# Patient Record
Sex: FEMALE | Race: ASIAN | NOT HISPANIC OR LATINO | URBAN - METROPOLITAN AREA
[De-identification: names, ages, dates, MRNs, and addresses within clinical notes are randomized per-mention and may not be internally consistent; named-entity substitution may affect disease eponyms.]

---
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
## 2024-05-20 NOTE — ED PROVIDER NOTE - NS ED ATTENDING STATEMENT MOD
good, to achieve stated therapy goals
I have personally provided the amount of critical care time documented below concurrently with the resident/fellow.  This time excludes time spent on separate procedures and time spent teaching. I have reviewed the resident’s / fellow’s documentation and I agree with the history, exam, and assessment and plan of care.

## 2024-07-12 NOTE — ED PEDIATRIC NURSE NOTE - PRO INTERPRETER NEED 2
"7/12/2024      Savanna Chakraborty  [address] [facility] [address]      Dear Colleague,    Thank you for referring your patient, Savanna Chakraborty, to the [facility]. Please see a copy of my visit note below.     Current Eye Medications:  Celluvisc as needed both eyes     Subjective:  Complete eye exam. Vision comes and goes with dryness. Vision usually does well in the morning, then gets blurry in afternoon both eyes. Always itching both eyes. No eye pain in either eye.      Objective:  See Ophthalmology Exam.       Assessment:  Excellent result from recent lid surgery with AM; otherwise stable eye exam.      ICD-10-CM    1. Encounter for examination of eyes and vision with abnormal findings  Z01.01       2. Presbyopia  H52.4       3. Bilateral corneal scars  H17.9       4. History of tarsorrhaphy, ou  Z98.890       5. History of blepharoplasty, ptosis repair, ou (AM)  Z98.890       6. 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 Can use +1.50 over the counter glasses for computer work.     May use artificial tears up to four times a day (like Refresh Optive, Systane Balance, or TheraTears. Avoid \"get the red out\" drops and generic artifical tears).     Continue Celluvisc drops as needed     Call in March 2025 for an appointment in July 2025 for Complete Exam    Dr. Bennett [phone]         Again, thank you for allowing me to participate in the care of your patient.        Sincerely,        Easton Bennett MD  " English

## 2024-08-29 ENCOUNTER — APPOINTMENT (OUTPATIENT)
Dept: MRI IMAGING | Facility: CLINIC | Age: 19
End: 2024-08-29

## 2024-08-29 ENCOUNTER — OUTPATIENT (OUTPATIENT)
Dept: OUTPATIENT SERVICES | Facility: HOSPITAL | Age: 19
LOS: 1 days | End: 2024-08-29
Payer: COMMERCIAL

## 2024-08-29 DIAGNOSIS — Z98.890 OTHER SPECIFIED POSTPROCEDURAL STATES: Chronic | ICD-10-CM

## 2024-08-29 DIAGNOSIS — Z00.8 ENCOUNTER FOR OTHER GENERAL EXAMINATION: ICD-10-CM

## 2024-08-29 DIAGNOSIS — L65.9 NONSCARRING HAIR LOSS, UNSPECIFIED: ICD-10-CM

## 2024-08-29 DIAGNOSIS — D35.00 BENIGN NEOPLASM OF UNSPECIFIED ADRENAL GLAND: ICD-10-CM

## 2024-08-29 PROCEDURE — 74181 MRI ABDOMEN W/O CONTRAST: CPT | Mod: 26

## 2024-08-29 PROCEDURE — 74181 MRI ABDOMEN W/O CONTRAST: CPT

## 2024-09-22 ENCOUNTER — EMERGENCY (EMERGENCY)
Facility: HOSPITAL | Age: 19
LOS: 1 days | Discharge: HOME | End: 2024-09-22
Attending: EMERGENCY MEDICINE
Payer: COMMERCIAL

## 2024-09-22 VITALS
HEIGHT: 66 IN | WEIGHT: 134.92 LBS | TEMPERATURE: 100 F | HEART RATE: 117 BPM | RESPIRATION RATE: 20 BRPM | OXYGEN SATURATION: 99 % | DIASTOLIC BLOOD PRESSURE: 80 MMHG | SYSTOLIC BLOOD PRESSURE: 131 MMHG

## 2024-09-22 DIAGNOSIS — Z98.890 OTHER SPECIFIED POSTPROCEDURAL STATES: Chronic | ICD-10-CM

## 2024-09-22 LAB
ALBUMIN SERPL ELPH-MCNC: 3.7 G/DL — SIGNIFICANT CHANGE UP (ref 3.3–5)
ALP SERPL-CCNC: 73 U/L — SIGNIFICANT CHANGE UP (ref 40–120)
ALT FLD-CCNC: 16 U/L — SIGNIFICANT CHANGE UP (ref 10–45)
ANION GAP SERPL CALC-SCNC: 11 MMOL/L — SIGNIFICANT CHANGE UP (ref 5–17)
AST SERPL-CCNC: 12 U/L — SIGNIFICANT CHANGE UP (ref 10–40)
BASOPHILS # BLD AUTO: 0.05 K/UL — SIGNIFICANT CHANGE UP (ref 0–0.2)
BASOPHILS NFR BLD AUTO: 0.3 % — SIGNIFICANT CHANGE UP (ref 0–2)
BILIRUB SERPL-MCNC: 1 MG/DL — SIGNIFICANT CHANGE UP (ref 0.2–1.2)
BUN SERPL-MCNC: 9 MG/DL — SIGNIFICANT CHANGE UP (ref 7–23)
CALCIUM SERPL-MCNC: 8.5 MG/DL — SIGNIFICANT CHANGE UP (ref 8.4–10.5)
CHLORIDE SERPL-SCNC: 104 MMOL/L — SIGNIFICANT CHANGE UP (ref 96–108)
CO2 SERPL-SCNC: 24 MMOL/L — SIGNIFICANT CHANGE UP (ref 22–31)
CREAT SERPL-MCNC: 0.73 MG/DL — SIGNIFICANT CHANGE UP (ref 0.5–1.3)
EGFR: 121 ML/MIN/1.73M2 — SIGNIFICANT CHANGE UP
EOSINOPHIL # BLD AUTO: 0.08 K/UL — SIGNIFICANT CHANGE UP (ref 0–0.5)
EOSINOPHIL NFR BLD AUTO: 0.5 % — SIGNIFICANT CHANGE UP (ref 0–6)
GAS PNL BLDV: SIGNIFICANT CHANGE UP
GLUCOSE SERPL-MCNC: 100 MG/DL — HIGH (ref 70–99)
HCT VFR BLD CALC: 38.9 % — SIGNIFICANT CHANGE UP (ref 34.5–45)
HGB BLD-MCNC: 12.5 G/DL — SIGNIFICANT CHANGE UP (ref 11.5–15.5)
IMM GRANULOCYTES NFR BLD AUTO: 0.3 % — SIGNIFICANT CHANGE UP (ref 0–0.9)
LYMPHOCYTES # BLD AUTO: 1.75 K/UL — SIGNIFICANT CHANGE UP (ref 1–3.3)
LYMPHOCYTES # BLD AUTO: 10.6 % — LOW (ref 13–44)
MCHC RBC-ENTMCNC: 29.6 PG — SIGNIFICANT CHANGE UP (ref 27–34)
MCHC RBC-ENTMCNC: 32.1 GM/DL — SIGNIFICANT CHANGE UP (ref 32–36)
MCV RBC AUTO: 92 FL — SIGNIFICANT CHANGE UP (ref 80–100)
MONOCYTES # BLD AUTO: 0.96 K/UL — HIGH (ref 0–0.9)
MONOCYTES NFR BLD AUTO: 5.8 % — SIGNIFICANT CHANGE UP (ref 2–14)
NEUTROPHILS # BLD AUTO: 13.67 K/UL — HIGH (ref 1.8–7.4)
NEUTROPHILS NFR BLD AUTO: 82.5 % — HIGH (ref 43–77)
NRBC # BLD: 0 /100 WBCS — SIGNIFICANT CHANGE UP (ref 0–0)
PLATELET # BLD AUTO: 153 K/UL — SIGNIFICANT CHANGE UP (ref 150–400)
POTASSIUM SERPL-MCNC: 4 MMOL/L — SIGNIFICANT CHANGE UP (ref 3.5–5.3)
POTASSIUM SERPL-SCNC: 4 MMOL/L — SIGNIFICANT CHANGE UP (ref 3.5–5.3)
PROT SERPL-MCNC: 6.7 G/DL — SIGNIFICANT CHANGE UP (ref 6–8.3)
RBC # BLD: 4.23 M/UL — SIGNIFICANT CHANGE UP (ref 3.8–5.2)
RBC # FLD: 12.5 % — SIGNIFICANT CHANGE UP (ref 10.3–14.5)
S PYO AG SPEC QL IA: NEGATIVE — SIGNIFICANT CHANGE UP
SODIUM SERPL-SCNC: 139 MMOL/L — SIGNIFICANT CHANGE UP (ref 135–145)
WBC # BLD: 16.56 K/UL — HIGH (ref 3.8–10.5)
WBC # FLD AUTO: 16.56 K/UL — HIGH (ref 3.8–10.5)

## 2024-09-22 PROCEDURE — 99285 EMERGENCY DEPT VISIT HI MDM: CPT

## 2024-09-22 RX ORDER — KETOROLAC TROMETHAMINE 30 MG/ML
15 INJECTION, SOLUTION INTRAMUSCULAR ONCE
Refills: 0 | Status: DISCONTINUED | OUTPATIENT
Start: 2024-09-22 | End: 2024-09-22

## 2024-09-22 RX ORDER — DEXAMETHASONE 0.75 MG
10 TABLET ORAL ONCE
Refills: 0 | Status: COMPLETED | OUTPATIENT
Start: 2024-09-22 | End: 2024-09-22

## 2024-09-22 RX ORDER — SODIUM CHLORIDE 9 MG/ML
1000 INJECTION INTRAMUSCULAR; INTRAVENOUS; SUBCUTANEOUS ONCE
Refills: 0 | Status: COMPLETED | OUTPATIENT
Start: 2024-09-22 | End: 2024-09-22

## 2024-09-22 RX ORDER — AMPICILLIN SODIUM AND SULBACTAM SODIUM 1; .5 G/1; G/1
3 INJECTION, POWDER, FOR SOLUTION INTRAMUSCULAR; INTRAVENOUS ONCE
Refills: 0 | Status: COMPLETED | OUTPATIENT
Start: 2024-09-22 | End: 2024-09-22

## 2024-09-22 RX ADMIN — Medication 10 MILLIGRAM(S): at 22:17

## 2024-09-22 RX ADMIN — AMPICILLIN SODIUM AND SULBACTAM SODIUM 200 GRAM(S): 1; .5 INJECTION, POWDER, FOR SOLUTION INTRAMUSCULAR; INTRAVENOUS at 22:17

## 2024-09-22 RX ADMIN — KETOROLAC TROMETHAMINE 15 MILLIGRAM(S): 30 INJECTION, SOLUTION INTRAMUSCULAR at 22:17

## 2024-09-22 RX ADMIN — SODIUM CHLORIDE 1000 MILLILITER(S): 9 INJECTION INTRAMUSCULAR; INTRAVENOUS; SUBCUTANEOUS at 22:17

## 2024-09-22 NOTE — CONSULT NOTE ADULT - ASSESSMENT
Assessment:  The patient is a 19 year old female with no significant past medical history who presents to the emergency room at [facility] with a chief complaint of sore throat x 3 days with difficulty swallowing today.  Exam consistent with     Plan:  1)  Assessment:  The patient is a 19 year old female with no significant past medical history who presents to the emergency room at [facility] with a chief complaint of sore throat x 3 days with difficulty swallowing today.  Exam consistent with right peritonsillar phlegmon/early abscess.    Plan:  1) bedside needle aspirations   2) unasyn  3) decadron 10mg IV q8hrs x 3 doses  4) CDU is possible

## 2024-09-22 NOTE — ED ADULT TRIAGE NOTE - CHIEF COMPLAINT QUOTE
sore throat, unable to speak, diff swallow; spitting out secretions; went to urgent care; was told there is a peritonsialr abscess

## 2024-09-22 NOTE — CONSULT NOTE ADULT - SUBJECTIVE AND OBJECTIVE BOX
HPI: The patient is a 19 year old female with no significant past medical history who presents to the emergency room at [facility] with a chief complaint of sore throat x 3 days with difficulty swallowing today. Patient unable to tolerate her secretions due to pain and swelling. She was evaluated at urgent care and sent to ED for concern for peritonsillar abscess. patient took aleve at 2pm today. Endorses subjective fever, no recorded temps. Also reporting right earache. Denies nausea, vomiting, abdominal pain, diarrhea. No sick contacts.    HIV:    HIV Test Questions:  · In accordance with [place] State law, we offer every patient who comes to our ED an HIV test. Would you like to be tested today?	Opt out    HEPATITIS C TEST QUESTIONS:    Hepatitis C Test Questions:  · In accordance with [place] State Law, we offer every patient a Hepatitis C test. Would you like to be tested today?	Opt out    PAST MEDICAL/SURGICAL/FAMILY/SOCIAL HISTORY:    Past Medical, Past Surgical, and Family History:  PAST MEDICAL HISTORY:  ADHD   Anxiety   Depression   Pseudoseizures.     PAST SURGICAL HISTORY:  History of laminectomy.     Tobacco Usage:  · Tobacco Usage	Unknown if ever smoked    ALLERGIES AND HOME MEDICATIONS:   Allergies:        Allergies:  	No Known Allergies:     Home Medications:   * Patient Currently Takes Medications as of 20-Mar-2023 17:29 documented in Structured Notes  · 	Outpatient physical therapy :   · 	dexamethasone 2 mg oral tablet: 4 tab(s) orally once a day x 2 days  	3 tab(s) orally once a day x 2 days  	2 tab(s) orally once a day x 2 days  	1 tab(s) orally once a day x 2 days  · 	acetaminophen 325 mg oral tablet: 2 tab(s) orally every 6 hours, As needed, Temp greater or equal to 38.5C (101.3F), Mild Pain (1 - 3)  · 	escitalopram 10 mg oral tablet: 1 tab(s) orally once a day (at bedtime). total daily dose 15mg.  · 	traZODone 50 mg oral tablet: 1 tab(s) orally once a day (at bedtime)  · 	lamoTRIgine 25 mg oral tablet, extended release: 1 tab(s) orally once a day (at bedtime)  · 	tiZANidine 2 mg oral tablet: 1 tab(s) orally , As Needed  	NOTE: last dispensed December 2022  · 	escitalopram 5 mg oral tablet: 1 tab(s) orally once a day. total daily dose 15mg.    LABS:  CBC Full  -  ( 22 Sep 2024 22:25 )  WBC Count : 16.56 K/uL  Hemoglobin : 12.5 g/dL  Hematocrit : 38.9 %  Platelet Count - Automated : 153 K/uL  Mean Cell Volume : 92.0 fl  Mean Cell Hemoglobin : 29.6 pg  Mean Cell Hemoglobin Concentration : 32.1 gm/dL  Auto Neutrophil # : 13.67 K/uL  Auto Lymphocyte # : 1.75 K/uL  Auto Monocyte # : 0.96 K/uL  Auto Eosinophil # : 0.08 K/uL  Auto Basophil # : 0.05 K/uL  Auto Neutrophil % : 82.5 %  Auto Lymphocyte % : 10.6 %  Auto Monocyte % : 5.8 %  Auto Eosinophil % : 0.5 %  Auto Basophil % : 0.3 %

## 2024-09-22 NOTE — CONSULT NOTE ADULT - NOSE
nasal/sinus endoscopy: max sinus with mucoid fluid b/l via inferior meatus, ss clear b/l/clear discharge/inflamed mucosa/congestion

## 2024-09-22 NOTE — ED PROVIDER NOTE - ATTENDING CONTRIBUTION TO CARE
I performed a history and physical exam of the patient and discussed their management with the resident. I reviewed the resident's note and agree with the documented findings and plan of care.  Elizabeth Tubbs MD

## 2024-09-22 NOTE — ED PROVIDER NOTE - OBJECTIVE STATEMENT
19F presents to the ED complaining of sore throat x 3 days with difficulty swallowing today. Patient unable to tolerate her secretions due to pain and swelling. She was evaluated at urgent care and sent to ED for concern for peritonsillar abscess. patient took aleve at 2pm today. Endorses subjective fever, no recorded temps. Also reporting right earache. Denies nausea, vomiting, abdominal pain, diarrhea. No sick contacts.

## 2024-09-22 NOTE — CONSULT NOTE ADULT - COMMENTS
Vital Signs Last 24 Hrs  T(C): 37.6 (22 Sep 2024 21:25), Max: 37.6 (22 Sep 2024 21:25)  T(F): 99.6 (22 Sep 2024 21:25), Max: 99.6 (22 Sep 2024 21:25)  HR: 117 (22 Sep 2024 21:25) (117 - 117)  BP: 131/80 (22 Sep 2024 21:25) (131/80 - 131/80)  BP(mean): --  RR: 20 (22 Sep 2024 21:25) (20 - 20)  SpO2: 99% (22 Sep 2024 21:25) (99% - 99%)    Parameters below as of 22 Sep 2024 21:25  Patient On (Oxygen Delivery Method): room air

## 2024-09-22 NOTE — ED PROVIDER NOTE - PHYSICAL EXAMINATION
GEN: NAD, awake, eyes open spontaneously  EYES: normal conjunctiva, perrl  ENT: NCAT, MMM, spitting up secretions. Right peritonsillar abscess with uvula deviation to the left.   NECK: Tender right anterior cervical LNs   CHEST/LUNGS: Non-tachypneic, CTAB, bilateral breath sounds  CARDIAC: Non-tachycardic, normal perfusion  ABDOMEN: Soft, NTND, No rebound/guarding

## 2024-09-22 NOTE — ED PROVIDER NOTE - PROGRESS NOTE DETAILS
Lilliana Xavier DO (PGY-2): ENT paged Jorge Lees, PGY3 - ENT drained the abscess. recommend CDU observation for unasyn. Per ENT no more need for decadron as swelling was minimal

## 2024-09-22 NOTE — CONSULT NOTE ADULT - ENT GEN HX ROS MEA POS PC
sinus symptoms/nasal congestion/nasal discharge/nasal obstruction/post-nasal discharge/throat pain/dysphagia/sore throat

## 2024-09-22 NOTE — CONSULT NOTE ADULT - ADDITIONAL PE
Procedure: needle aspiration of right peritonsillar phlegmon  after verbal consent obtained, right peritonsillar anesthetized with 3 cc of 1% lidocaine and benzacaine spray.  next 20 gauage needle used to aspirate 3cc of pus from right peritonsillar area. Patient tolerated procedure well no complications.

## 2024-09-22 NOTE — ED PROVIDER NOTE - CLINICAL SUMMARY MEDICAL DECISION MAKING FREE TEXT BOX
19F presenting with sore throat x 3 days with subjective fevers. Now unable to tolerate secretions. Exam with right peritonsillar abscess and uvula deviation. Vitals notable for tachycardia with low grade oral temp. Overall well appearing. Will check labs, pain meds, steroids, IV abx, and ENT consult. WDL

## 2024-09-22 NOTE — ED ADULT NURSE NOTE - OBJECTIVE STATEMENT
19yF, A&Ox4, presents to the ED c/o throat pain, swelling and difficulty swallowing x3 days. Pt states pain began 3 days ago, worsening. Unable to speak, hx obtained by family at bedside. Pt visited urgent care today and was told she has an abcess on right tonsil. Pt endorsing mild fevers/chills at home. Gross neuro intact, pulses x 4, moving all extremities, abdomen soft nontender nondistended, skin intact.

## 2024-09-23 LAB
C TRACH RRNA SPEC QL NAA+PROBE: SIGNIFICANT CHANGE UP
HCG SERPL-ACNC: <2 MIU/ML — SIGNIFICANT CHANGE UP
N GONORRHOEA RRNA SPEC QL NAA+PROBE: SIGNIFICANT CHANGE UP
S PYO DNA THROAT QL NAA+PROBE: SIGNIFICANT CHANGE UP

## 2024-09-23 PROCEDURE — G0378: CPT

## 2024-09-23 PROCEDURE — 96375 TX/PRO/DX INJ NEW DRUG ADDON: CPT | Mod: XU

## 2024-09-23 PROCEDURE — 96376 TX/PRO/DX INJ SAME DRUG ADON: CPT | Mod: XU

## 2024-09-23 PROCEDURE — 80053 COMPREHEN METABOLIC PANEL: CPT

## 2024-09-23 PROCEDURE — 85014 HEMATOCRIT: CPT

## 2024-09-23 PROCEDURE — 85018 HEMOGLOBIN: CPT

## 2024-09-23 PROCEDURE — 87880 STREP A ASSAY W/OPTIC: CPT

## 2024-09-23 PROCEDURE — 87081 CULTURE SCREEN ONLY: CPT

## 2024-09-23 PROCEDURE — 85025 COMPLETE CBC W/AUTO DIFF WBC: CPT

## 2024-09-23 PROCEDURE — 82947 ASSAY GLUCOSE BLOOD QUANT: CPT

## 2024-09-23 PROCEDURE — 70491 CT SOFT TISSUE NECK W/DYE: CPT | Mod: MC

## 2024-09-23 PROCEDURE — 70491 CT SOFT TISSUE NECK W/DYE: CPT | Mod: 26,MC

## 2024-09-23 PROCEDURE — 82435 ASSAY OF BLOOD CHLORIDE: CPT

## 2024-09-23 PROCEDURE — 84132 ASSAY OF SERUM POTASSIUM: CPT

## 2024-09-23 PROCEDURE — 82803 BLOOD GASES ANY COMBINATION: CPT

## 2024-09-23 PROCEDURE — 87591 N.GONORRHOEAE DNA AMP PROB: CPT

## 2024-09-23 PROCEDURE — 87651 STREP A DNA AMP PROBE: CPT

## 2024-09-23 PROCEDURE — 82330 ASSAY OF CALCIUM: CPT

## 2024-09-23 PROCEDURE — 84295 ASSAY OF SERUM SODIUM: CPT

## 2024-09-23 PROCEDURE — 87798 DETECT AGENT NOS DNA AMP: CPT

## 2024-09-23 PROCEDURE — 87491 CHLMYD TRACH DNA AMP PROBE: CPT

## 2024-09-23 PROCEDURE — 83605 ASSAY OF LACTIC ACID: CPT

## 2024-09-23 PROCEDURE — 99236 HOSP IP/OBS SAME DATE HI 85: CPT

## 2024-09-23 PROCEDURE — 84702 CHORIONIC GONADOTROPIN TEST: CPT

## 2024-09-23 RX ORDER — AMPICILLIN SODIUM AND SULBACTAM SODIUM 1; .5 G/1; G/1
3 INJECTION, POWDER, FOR SOLUTION INTRAMUSCULAR; INTRAVENOUS EVERY 6 HOURS
Refills: 0 | Status: ACTIVE | OUTPATIENT
Start: 2024-09-23 | End: 2025-08-22

## 2024-09-23 RX ORDER — BUPROPION HYDROCHLORIDE 150 MG/1
150 TABLET ORAL DAILY
Refills: 0 | Status: ACTIVE | OUTPATIENT
Start: 2024-09-23 | End: 2025-08-22

## 2024-09-23 RX ORDER — AMOXICILLIN AND CLAVULANATE POTASSIUM 250; 125 MG/1; MG/1
875 TABLET, FILM COATED ORAL
Qty: 20 | Refills: 0
Start: 2024-09-23 | End: 2024-10-02

## 2024-09-23 RX ORDER — DEXAMETHASONE 0.75 MG
10 TABLET ORAL EVERY 8 HOURS
Refills: 0 | Status: COMPLETED | OUTPATIENT
Start: 2024-09-23 | End: 2024-09-23

## 2024-09-23 RX ORDER — CHLORHEXIDINE GLUCONATE 40 MG/ML
15 SOLUTION TOPICAL
Qty: 1 | Refills: 0
Start: 2024-09-23 | End: 2024-09-29

## 2024-09-23 RX ORDER — AMPICILLIN SODIUM AND SULBACTAM SODIUM 1; .5 G/1; G/1
3 INJECTION, POWDER, FOR SOLUTION INTRAMUSCULAR; INTRAVENOUS ONCE
Refills: 0 | Status: COMPLETED | OUTPATIENT
Start: 2024-09-23 | End: 2024-09-23

## 2024-09-23 RX ORDER — KETOROLAC TROMETHAMINE 30 MG/ML
15 INJECTION, SOLUTION INTRAMUSCULAR ONCE
Refills: 0 | Status: DISCONTINUED | OUTPATIENT
Start: 2024-09-23 | End: 2024-09-23

## 2024-09-23 RX ORDER — SPIRONOLACTONE 25 MG/1
50 TABLET, FILM COATED ORAL DAILY
Refills: 0 | Status: ACTIVE | OUTPATIENT
Start: 2024-09-23 | End: 2025-08-22

## 2024-09-23 RX ORDER — ACETAMINOPHEN 325 MG/1
1000 TABLET ORAL ONCE
Refills: 0 | Status: COMPLETED | OUTPATIENT
Start: 2024-09-23 | End: 2024-09-23

## 2024-09-23 RX ORDER — SODIUM CHLORIDE 9 MG/ML
1000 INJECTION INTRAMUSCULAR; INTRAVENOUS; SUBCUTANEOUS
Refills: 0 | Status: ACTIVE | OUTPATIENT
Start: 2024-09-23 | End: 2025-08-22

## 2024-09-23 RX ORDER — METHYLPREDNISOLONE 4 MG
1 TABLET ORAL
Qty: 1 | Refills: 0
Start: 2024-09-23

## 2024-09-23 RX ORDER — ACETAMINOPHEN 325 MG/1
975 TABLET ORAL EVERY 6 HOURS
Refills: 0 | Status: ACTIVE | OUTPATIENT
Start: 2024-09-23 | End: 2025-08-22

## 2024-09-23 RX ADMIN — Medication 100 MILLIGRAM(S): at 13:51

## 2024-09-23 RX ADMIN — SODIUM CHLORIDE 125 MILLILITER(S): 9 INJECTION INTRAMUSCULAR; INTRAVENOUS; SUBCUTANEOUS at 13:05

## 2024-09-23 RX ADMIN — AMPICILLIN SODIUM AND SULBACTAM SODIUM 200 GRAM(S): 1; .5 INJECTION, POWDER, FOR SOLUTION INTRAMUSCULAR; INTRAVENOUS at 17:52

## 2024-09-23 RX ADMIN — ACETAMINOPHEN 400 MILLIGRAM(S): 325 TABLET ORAL at 02:54

## 2024-09-23 RX ADMIN — AMPICILLIN SODIUM AND SULBACTAM SODIUM 200 GRAM(S): 1; .5 INJECTION, POWDER, FOR SOLUTION INTRAMUSCULAR; INTRAVENOUS at 11:58

## 2024-09-23 RX ADMIN — ACETAMINOPHEN 1000 MILLIGRAM(S): 325 TABLET ORAL at 08:50

## 2024-09-23 RX ADMIN — KETOROLAC TROMETHAMINE 15 MILLIGRAM(S): 30 INJECTION, SOLUTION INTRAMUSCULAR at 08:51

## 2024-09-23 RX ADMIN — BUPROPION HYDROCHLORIDE 150 MILLIGRAM(S): 150 TABLET ORAL at 11:59

## 2024-09-23 RX ADMIN — Medication 100 MILLIGRAM(S): at 21:40

## 2024-09-23 RX ADMIN — KETOROLAC TROMETHAMINE 15 MILLIGRAM(S): 30 INJECTION, SOLUTION INTRAMUSCULAR at 06:30

## 2024-09-23 RX ADMIN — AMPICILLIN SODIUM AND SULBACTAM SODIUM 200 GRAM(S): 1; .5 INJECTION, POWDER, FOR SOLUTION INTRAMUSCULAR; INTRAVENOUS at 06:31

## 2024-09-23 NOTE — ED CDU PROVIDER DISPOSITION NOTE - NSFOLLOWUPINSTRUCTIONS_ED_ALL_ED_FT
1) Follow-up with your primary care provider in 1-2 days.     Follow-up with ENT in 1 week.    2) Continue to take all medications as prescribed.      Take augmentin as prescribed.    3) Rest and stay hydrated. Pain can be managed with Acetaminophen (aka Tylenol) and Ibuprofen (aka Motrin or Advil) over the counter as directed.    4) Return to the ER for any new or worsening symptoms, difficulty breathing, trouble opening the mouth, difficulty swallowing, unable to eat or drink, fevers, chills, or if any other concerns.      Please read all attached patient information. 1) Follow-up with your primary care provider in 1-2 days.     Follow-up with ENT in 1 week.    [address], [address]  [place] [address]   Phone [phone]    2) Continue to take all medications as prescribed.      Take Medrol dose pack, Augmentin and Peridex as prescribed.    3) Rest and stay hydrated. Pain can be managed with Acetaminophen (aka Tylenol) and Ibuprofen (aka Motrin or Advil) over the counter as directed.    4) Return to the ER for any new or worsening symptoms, difficulty breathing, trouble opening the mouth, difficulty swallowing, unable to eat or drink, fevers, chills, or if any other concerns.      Please read all attached patient information.

## 2024-09-23 NOTE — ED CDU PROVIDER INITIAL DAY NOTE - ATTENDING APP SHARED VISIT CONTRIBUTION OF CARE
Abdi Ibarra MD (Attending Physician):    I performed a history and physical exam of the patient and discussed their management with the JOSE F. I have reviewed the JOSE F note and agree with the documented findings and plan of care, except as noted. This was a shared visit with an JOSE F. I reviewed and verified the documentation and independently performed my own history/exam/medical decision making. My medical decision making and observations are found below. Please refer to any progress notes for updates on clinical course.    HPI:  19F presents to the ED complaining of sore throat x 3 days with difficulty swallowing today. Patient unable to tolerate her secretions due to pain and swelling. She was evaluated at urgent care and sent to ED for concern for peritonsillar abscess. patient took aleve at 2pm today. Endorses subjective fever, no recorded temps. Also reporting right earache. Denies nausea, vomiting, abdominal pain, diarrhea. No sick contacts.    PE:  GEN: NAD, awake, eyes open spontaneously  EYES: normal conjunctiva, perrl  ENT: NCAT, MMM, spitting up secretions. Right peritonsillar abscess with uvula deviation to the left.  NECK: Tender right anterior cervical LNs  CHEST/LUNGS: Non-tachypneic, CTAB, bilateral breath sounds  CARDIAC: Non-tachycardic, normal perfusion  ABDOMEN: Soft, NTND, No rebound/guarding    MDM:  19F presenting with sore throat x 3 days with subjective fevers. Now unable to tolerate secretions. Exam with right peritonsillar abscess and uvula deviation. Vitals notable for tachycardia with low grade oral temp. Overall well appearing. CT neck showed, "2.5 x 1.5 x 2.6 cm right peritonsillar abscess, partially effacing the oropharynx. Aerodigestive tract remains patent." ENT was consulted and performed bedside I&D of PTA. Pt already received one dose of Unasyn in ED. Pt sent to CDU for additional IV abx, pain control, and observation. No signs of airway compromise at this time.

## 2024-09-23 NOTE — ED CDU PROVIDER INITIAL DAY NOTE - PHYSICAL EXAMINATION
GEN: Pt in NAD, A&O x3.  PSYCH: Affect appropriate.  EYES: Sclera white w/o injection.  ENT: Head NCAT. No dysphonia, erythema of the palatine arch and posterior pharynx, +mild trismus, NO excessive salivation, NO evidence  of airway obstruction, No pus on the floor of the mouth, No protrusion of the submental area, No uvula deviation. Neck supple FROM.  RESP: CTA b/l, no wheezes, rales, or rhonchi.   CARDIAC: RRR, clear distinct S1, S2, no appreciable murmurs.  ABD: Abdomen soft, non-tender.  VASC: 2+ radial and dorsalis pedis pulses b/l. No edema or calf tenderness.  SKIN: No rashes on the trunk.

## 2024-09-23 NOTE — ED ADULT NURSE REASSESSMENT NOTE - NS ED NURSE REASSESS COMMENT FT1
Pt received from JOSE DAVID Martin at 1900. Pt oriented to CDU and plan of care was discussed. Pt is observed for peritonsillar abscess, here for IV ABX, pain control. Pt tolerating PO, denies pain, throat swelling, difficulty swallowing at this time. A&Ox4, obeys commands, ambulatory, independent. Respirations spontaneous and unlabored. Denies SOB, dyspnea, cough, CP, palpitations. IV site patent, no signs of infiltration noted. Denies N/V/D/C. Pt afebrile, denies chills. Pt resting in bed. Safety & comfort measures maintained. Call bell within reach.

## 2024-09-23 NOTE — ED ADULT NURSE REASSESSMENT NOTE - NS ED NURSE REASSESS COMMENT FT1
Pt received from JOSE DAVID Portillo. Pt oriented to CDU & plan of care was discussed. Pt A&O x 4. Pt in CDU for IV abx, / steroids, pain control. Pt denies any difficulty breathing, difficulty swallowing, chills or fever. V/S stable, pt afebrile,  IV in place, patent and free of signs of infiltration. Pt resting in bed. Safety & comfort measures maintained. Call bell in reach. Will continue to monitor.

## 2024-09-23 NOTE — ED CDU PROVIDER DISPOSITION NOTE - CLINICAL COURSE
19F presents to the ED complaining of sore throat x 3 days with difficulty swallowing today. Patient unable to tolerate her secretions due to pain and swelling. She was evaluated at urgent care and sent to ED for concern for peritonsillar abscess. patient took aleve at 2pm today. Endorses subjective fever, no recorded temps. Also reporting right earache. Denies nausea, vomiting, abdominal pain, diarrhea. No sick contacts.  ED course: WBC 16.56 with left shift, CMP and VBG nonactionable, lactate 0.9, hCG negative, rapid group A strep negative.  CT soft tissue neck showing 2.5 x 1.5 x 2.6 cm right peritonsillar abscess.  Patient evaluated by ENT status post bedside needle aspiration.  ENT recommended Unasyn, Decadron 10 mg every 8 hours for 3 doses.  Patient sent to CDU for frequent evaluation, pain control, antibiotics and steroids.  CDU Course: ____ 19F presents to the ED complaining of sore throat x 3 days with difficulty swallowing today. Patient unable to tolerate her secretions due to pain and swelling. She was evaluated at urgent care and sent to ED for concern for peritonsillar abscess. patient took aleve at 2pm today. Endorses subjective fever, no recorded temps. Also reporting right earache. Denies nausea, vomiting, abdominal pain, diarrhea. No sick contacts.  ED course: WBC 16.56 with left shift, CMP and VBG nonactionable, lactate 0.9, hCG negative, rapid group A strep negative.  CT soft tissue neck showing 2.5 x 1.5 x 2.6 cm right peritonsillar abscess.  Patient evaluated by ENT status post bedside needle aspiration.  ENT recommended Unasyn, Decadron 10 mg every 8 hours for 3 doses.  Patient sent to CDU for frequent evaluation, pain control, antibiotics and steroids.  CDU Course: Pt received IV abx and completed 3rd dose of Decadron and feeling better, tolerating PO. ENT at bedside, stable for d/c. Plan for Augmentin 875mg bid x 10 days, Medrol dose pack and Peridex. Outpatient f/u with ENT in x 1wk. c/d/w Dr. Rice.

## 2024-09-23 NOTE — ED CDU PROVIDER DISPOSITION NOTE - PATIENT PORTAL LINK FT
You can access the FollowMyHealth Patient Portal offered by [facility] by registering at the following website: [email or web address]. By joining "[employer]’s FollowMyHealth portal, you will also be able to view your health information using other applications (apps) compatible with our system.

## 2024-09-23 NOTE — ED CDU PROVIDER INITIAL DAY NOTE - PROGRESS NOTE DETAILS
CDU PROGRESS NOTE MAHNAZ SHELDON: Received pt at 1900 sign-out. Case/plan reviewed. Pt resting in stretcher in NAD. VSS. Pt ate dinner. No dysphonia, mild erythema of the palatine arch and posterior pharynx, no trismus, NO excessive salivation, NO evidence  of airway obstruction, No pus on the floor of the mouth, No protrusion of the submental area, No uvula deviation. Neck supple FROM. Pt without complaints. Will continue to monitor overnight. CDU PROGRESS NOTE MAHNAZ SHELDON: Pt received IV abx and completed 3rd dose of Decadron and feeling better. ENT at bedside, stable for d/c. Plan for Augmentin 875mg bid x 10 days, Medrol dose pack and Peridex. Outpatient f/u with ENT in x 1wk. c/d/w Dr. Rice.

## 2024-09-23 NOTE — ED CDU PROVIDER DISPOSITION NOTE - NSFOLLOWUPCLINICS_GEN_ALL_ED_FT
[facility]  ENT  [address], [address]  [place] [address]  Phone: [phone]  Fax:

## 2024-09-23 NOTE — ED CDU PROVIDER INITIAL DAY NOTE - OBJECTIVE STATEMENT
19F PMHx, aniety, dperession, pseudoseizure, PSHX prior laminectomy, presents to the ED complaining of sore throat x 3 days with difficulty swallowing today. Patient unable to tolerate her secretions due to pain and swelling. She was evaluated at urgent care and sent to ED for concern for peritonsillar abscess. patient took aleve at 2pm today. Endorses subjective fever, no recorded temps. Also reporting right earache. Denies nausea, vomiting, abdominal pain, diarrhea. No sick contacts.  ED course: WBC 16.56 with left shift, CMP and VBG nonactionable, lactate 0.9, hCG negative, rapid group A strep negative.  CT soft tissue neck showing 2.5 x 1.5 x 2.6 cm right peritonsillar abscess.  Patient evaluated by ENT status post bedside needle aspiration.  ENT recommended Unasyn, Decadron 10 mg every 8 hours for 3 doses.  Patient sent to CDU for frequent evaluation, pain control, antibiotics and steroids. Pt reported improvement in sxs after needle aspiration, denied voice change, odynophagia, dysphagia, and dyspnea.

## 2024-09-23 NOTE — ED CDU PROVIDER DISPOSITION NOTE - ATTENDING APP SHARED VISIT CONTRIBUTION OF CARE
I performed a history and physical exam of the patient and discussed their management with the Advanced Care Practitioner. I reviewed the ACP's note and agree with the documented findings and plan of care.   19 year old female with sore throat x 3 days. in cdu for iv abx and decadron.  ent compelted needle aspiration.  patient feeling much better. ent evaluated again and cleared for discharge. antibioitics, steroids and outpatient f/u provided. Godwint verbalized understanding. Stable for discharge.

## 2024-09-24 VITALS
OXYGEN SATURATION: 100 % | RESPIRATION RATE: 18 BRPM | HEART RATE: 88 BPM | SYSTOLIC BLOOD PRESSURE: 116 MMHG | TEMPERATURE: 99 F | DIASTOLIC BLOOD PRESSURE: 74 MMHG

## 2024-11-20 NOTE — BH PATIENT PROFILE - NSVRISKLVLREC_PSY_ALL_CORE
Fax received from [email or web address] looking for a refill on the pt's sodium chloride 1gm - give 1 tablet po TID. Script is not on pt's current or past med list. Please advise if you would like pt to be taking this medication. Medication was prescribed last by Dr. Shelbi Aponte on 11/08/2023.  
I have refilled the medication but I have reduced the dosage to twice daily.   
When did he last take the medication? I'd like to know so that I can follow lab work to see if he should remain on the sodium.   
Writer spoke with Claudine with [facility]. Per Claudine, pt last had the medication this afternoon. Script was last filled on 10/22/2024 by Shelbi Aponte. Per Claudine, Shelbi Aponte is no longer going to be prescribing the script.  
Minimal (Score 0-3)

## 2024-12-13 NOTE — ED PEDIATRIC NURSE NOTE - NS ED NURSE LEVEL OF CONSCIOUSNESS AFFECT
Isabela spoke with patient. Aware it will be filled 12/18. Patient states she has enough medication and is okay with it being sent then  
Patient uses mail order.     Please send to pharmacy to allow time for processing and mail.         
Calm

## 2025-05-14 NOTE — ED PROVIDER NOTE - UNABLE TO OBTAIN
Patito PEDRAZA called stating the patient is done with nursing services but will continue with OT & PT  
Unresponsive

## 2025-05-27 ENCOUNTER — TRANSCRIPTION ENCOUNTER (OUTPATIENT)
Age: 20
End: 2025-05-27

## 2025-05-27 ENCOUNTER — INPATIENT (INPATIENT)
Facility: HOSPITAL | Age: 20
LOS: 9 days | Discharge: HOME | End: 2025-06-06
Attending: PSYCHIATRY & NEUROLOGY | Admitting: PSYCHIATRY & NEUROLOGY
Payer: MEDICAID

## 2025-05-27 VITALS
DIASTOLIC BLOOD PRESSURE: 85 MMHG | SYSTOLIC BLOOD PRESSURE: 127 MMHG | TEMPERATURE: 98 F | HEART RATE: 88 BPM | WEIGHT: 130.07 LBS | OXYGEN SATURATION: 97 % | RESPIRATION RATE: 15 BRPM | HEIGHT: 66 IN

## 2025-05-27 DIAGNOSIS — Z98.890 OTHER SPECIFIED POSTPROCEDURAL STATES: Chronic | ICD-10-CM

## 2025-05-27 PROCEDURE — 99285 EMERGENCY DEPT VISIT HI MDM: CPT

## 2025-05-27 NOTE — ED ADULT TRIAGE NOTE - NS_BH TRG QUESTION6_ED_ALL_ED
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                                                                        
[facility] called wondering if patient is going to be scheduled at this time or if they can receive an update as to why patient is not scheduling. Thank you   
No
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
## 2025-05-27 NOTE — ED ADULT TRIAGE NOTE - CHIEF COMPLAINT QUOTE
alert oriented c/o mental breakdowns  feels unsafe to be alone hx depression ADHD  also c/o insomnia
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
## 2025-05-27 NOTE — ED ADULT NURSE NOTE - NS_SISCREENINGSR_GEN_ALL_ED
Refills have been requested for the following medications:        allopurinol (ZYLOPRIM) 100 MG tablet [Dr. Monte Dandy, MD]    Preferred pharmacy: [address] [place] [address] [phone] Rocky Martinez [phone]   Last Appointment:  11/29/2023  Future Appointments  2/28/2024  1:40 PM    Monte Dandy, MD [facility]
Negative

## 2025-05-27 NOTE — ED PROVIDER NOTE - PROGRESS NOTE DETAILS
Marcellus WOODALL: Pt signed out to me.  Labs reviewed and there are no concerning findings.  Pt is medically cleared.  Has been evaluated by psych and will require inpatient hospitalization.

## 2025-05-27 NOTE — ED PROVIDER NOTE - DATE/TIME 1
28-May-2025 01:35 Bactrim Counseling:  I discussed with the patient the risks of sulfa antibiotics including but not limited to GI upset, allergic reaction, drug rash, diarrhea, dizziness, photosensitivity, and yeast infections.  Rarely, more serious reactions can occur including but not limited to aplastic anemia, agranulocytosis, methemoglobinemia, blood dyscrasias, liver or kidney failure, lung infiltrates or desquamative/blistering drug rashes.

## 2025-05-27 NOTE — ED ADULT NURSE NOTE - OBJECTIVE STATEMENT
Patient received in behavioral health area. Patient A&Ox3 and ambulatory at baseline. Patient  referred to ED by therapist c/o "feeling unsafe at home". Pt denies significant phx. Pt states "is it safe in here", "I don't feel safe at home". Pt currently denies S/I, H/I, audio and visual hallucinations. Patient cooperative at this time. Airway patent, respirations unlabored. Patient denies dyspnea, shortness of breath, and chest pain; patient able to speak in complete and uninterrupted sentences. Patient denies headache, dizziness, lightheadedness, nausea/vomiting, fever/chills, and pain. Patient offers no complaints at this time. Pt changed into hospital gowns and pants; steady gait observed.

## 2025-05-28 DIAGNOSIS — F32.A DEPRESSION, UNSPECIFIED: ICD-10-CM

## 2025-05-28 DIAGNOSIS — F32.9 MAJOR DEPRESSIVE DISORDER, SINGLE EPISODE, UNSPECIFIED: ICD-10-CM

## 2025-05-28 DIAGNOSIS — T14.90XA INJURY, UNSPECIFIED, INITIAL ENCOUNTER: ICD-10-CM

## 2025-05-28 LAB
ALBUMIN SERPL ELPH-MCNC: 4.1 G/DL — SIGNIFICANT CHANGE UP (ref 3.3–5)
ALP SERPL-CCNC: 61 U/L — SIGNIFICANT CHANGE UP (ref 40–120)
ALT FLD-CCNC: 8 U/L — SIGNIFICANT CHANGE UP (ref 4–33)
AMPHET UR-MCNC: NEGATIVE — SIGNIFICANT CHANGE UP
ANION GAP SERPL CALC-SCNC: 12 MMOL/L — SIGNIFICANT CHANGE UP (ref 7–14)
APAP SERPL-MCNC: <10 UG/ML — LOW (ref 15–25)
AST SERPL-CCNC: 11 U/L — SIGNIFICANT CHANGE UP (ref 4–32)
BARBITURATES UR SCN-MCNC: NEGATIVE — SIGNIFICANT CHANGE UP
BASOPHILS # BLD AUTO: 0.05 K/UL — SIGNIFICANT CHANGE UP (ref 0–0.2)
BASOPHILS NFR BLD AUTO: 0.5 % — SIGNIFICANT CHANGE UP (ref 0–2)
BENZODIAZ UR-MCNC: NEGATIVE — SIGNIFICANT CHANGE UP
BILIRUB SERPL-MCNC: 1.4 MG/DL — HIGH (ref 0.2–1.2)
BUN SERPL-MCNC: 9 MG/DL — SIGNIFICANT CHANGE UP (ref 7–23)
CALCIUM SERPL-MCNC: 9 MG/DL — SIGNIFICANT CHANGE UP (ref 8.4–10.5)
CHLORIDE SERPL-SCNC: 103 MMOL/L — SIGNIFICANT CHANGE UP (ref 98–107)
CO2 SERPL-SCNC: 23 MMOL/L — SIGNIFICANT CHANGE UP (ref 22–31)
COCAINE METAB.OTHER UR-MCNC: NEGATIVE — SIGNIFICANT CHANGE UP
CREAT SERPL-MCNC: 0.66 MG/DL — SIGNIFICANT CHANGE UP (ref 0.5–1.3)
CREATININE URINE RESULT, DAU: 52 MG/DL — SIGNIFICANT CHANGE UP
EGFR: 128 ML/MIN/1.73M2 — SIGNIFICANT CHANGE UP
EGFR: 128 ML/MIN/1.73M2 — SIGNIFICANT CHANGE UP
EOSINOPHIL # BLD AUTO: 0.06 K/UL — SIGNIFICANT CHANGE UP (ref 0–0.5)
EOSINOPHIL NFR BLD AUTO: 0.6 % — SIGNIFICANT CHANGE UP (ref 0–6)
ETHANOL SERPL-MCNC: <10 MG/DL — SIGNIFICANT CHANGE UP
FENTANYL UR QL SCN: NEGATIVE — SIGNIFICANT CHANGE UP
GLUCOSE SERPL-MCNC: 98 MG/DL — SIGNIFICANT CHANGE UP (ref 70–99)
HCG SERPL-ACNC: <1 MIU/ML — SIGNIFICANT CHANGE UP
HCT VFR BLD CALC: 44.1 % — SIGNIFICANT CHANGE UP (ref 34.5–45)
HGB BLD-MCNC: 14.4 G/DL — SIGNIFICANT CHANGE UP (ref 11.5–15.5)
IANC: 6.16 K/UL — SIGNIFICANT CHANGE UP (ref 1.8–7.4)
IMM GRANULOCYTES NFR BLD AUTO: 0.2 % — SIGNIFICANT CHANGE UP (ref 0–0.9)
LYMPHOCYTES # BLD AUTO: 3.25 K/UL — SIGNIFICANT CHANGE UP (ref 1–3.3)
LYMPHOCYTES # BLD AUTO: 32.1 % — SIGNIFICANT CHANGE UP (ref 13–44)
MCHC RBC-ENTMCNC: 30.3 PG — SIGNIFICANT CHANGE UP (ref 27–34)
MCHC RBC-ENTMCNC: 32.7 G/DL — SIGNIFICANT CHANGE UP (ref 32–36)
MCV RBC AUTO: 92.8 FL — SIGNIFICANT CHANGE UP (ref 80–100)
METHADONE UR-MCNC: NEGATIVE — SIGNIFICANT CHANGE UP
MONOCYTES # BLD AUTO: 0.6 K/UL — SIGNIFICANT CHANGE UP (ref 0–0.9)
MONOCYTES NFR BLD AUTO: 5.9 % — SIGNIFICANT CHANGE UP (ref 2–14)
NEUTROPHILS # BLD AUTO: 6.16 K/UL — SIGNIFICANT CHANGE UP (ref 1.8–7.4)
NEUTROPHILS NFR BLD AUTO: 60.7 % — SIGNIFICANT CHANGE UP (ref 43–77)
NRBC # BLD AUTO: 0 K/UL — SIGNIFICANT CHANGE UP (ref 0–0)
NRBC # FLD: 0 K/UL — SIGNIFICANT CHANGE UP (ref 0–0)
NRBC BLD AUTO-RTO: 0 /100 WBCS — SIGNIFICANT CHANGE UP (ref 0–0)
OPIATES UR-MCNC: NEGATIVE — SIGNIFICANT CHANGE UP
OXYCODONE UR-MCNC: NEGATIVE — SIGNIFICANT CHANGE UP
PCP SPEC-MCNC: SIGNIFICANT CHANGE UP
PCP UR-MCNC: NEGATIVE — SIGNIFICANT CHANGE UP
PLATELET # BLD AUTO: 209 K/UL — SIGNIFICANT CHANGE UP (ref 150–400)
POTASSIUM SERPL-MCNC: 4.1 MMOL/L — SIGNIFICANT CHANGE UP (ref 3.5–5.3)
POTASSIUM SERPL-SCNC: 4.1 MMOL/L — SIGNIFICANT CHANGE UP (ref 3.5–5.3)
PROT SERPL-MCNC: 6.9 G/DL — SIGNIFICANT CHANGE UP (ref 6–8.3)
RBC # BLD: 4.75 M/UL — SIGNIFICANT CHANGE UP (ref 3.8–5.2)
RBC # FLD: 11.8 % — SIGNIFICANT CHANGE UP (ref 10.3–14.5)
SALICYLATES SERPL-MCNC: <0.3 MG/DL — LOW (ref 15–30)
SARS-COV-2 RNA SPEC QL NAA+PROBE: SIGNIFICANT CHANGE UP
SODIUM SERPL-SCNC: 138 MMOL/L — SIGNIFICANT CHANGE UP (ref 135–145)
THC UR QL: NEGATIVE — SIGNIFICANT CHANGE UP
TOXICOLOGY SCREEN, DRUGS OF ABUSE, SERUM RESULT: SIGNIFICANT CHANGE UP
TSH SERPL-MCNC: 0.95 UIU/ML — SIGNIFICANT CHANGE UP (ref 0.27–4.2)
WBC # BLD: 10.14 K/UL — SIGNIFICANT CHANGE UP (ref 3.8–10.5)
WBC # FLD AUTO: 10.14 K/UL — SIGNIFICANT CHANGE UP (ref 3.8–10.5)

## 2025-05-28 PROCEDURE — 99222 1ST HOSP IP/OBS MODERATE 55: CPT

## 2025-05-28 PROCEDURE — 99285 EMERGENCY DEPT VISIT HI MDM: CPT | Mod: GC

## 2025-05-28 PROCEDURE — 90853 GROUP PSYCHOTHERAPY: CPT

## 2025-05-28 RX ORDER — NICOTINE POLACRILEX 4 MG/1
2 GUM, CHEWING ORAL
Refills: 0 | Status: DISCONTINUED | OUTPATIENT
Start: 2025-05-28 | End: 2025-06-06

## 2025-05-28 RX ORDER — LORAZEPAM 4 MG/ML
2 VIAL (ML) INJECTION ONCE
Refills: 0 | Status: DISCONTINUED | OUTPATIENT
Start: 2025-05-28 | End: 2025-06-02

## 2025-05-28 RX ORDER — NICOTINE POLACRILEX 4 MG/1
2 GUM, CHEWING ORAL
Refills: 0 | Status: DISCONTINUED | OUTPATIENT
Start: 2025-05-28 | End: 2025-05-28

## 2025-05-28 RX ORDER — LORAZEPAM 4 MG/ML
2 VIAL (ML) INJECTION EVERY 6 HOURS
Refills: 0 | Status: DISCONTINUED | OUTPATIENT
Start: 2025-05-28 | End: 2025-06-02

## 2025-05-28 RX ORDER — ACETAMINOPHEN 500 MG/5ML
650 LIQUID (ML) ORAL EVERY 6 HOURS
Refills: 0 | Status: DISCONTINUED | OUTPATIENT
Start: 2025-05-28 | End: 2025-06-06

## 2025-05-28 RX ADMIN — NICOTINE POLACRILEX 2 MILLIGRAM(S): 4 GUM, CHEWING ORAL at 05:52

## 2025-05-28 RX ADMIN — NICOTINE POLACRILEX 2 MILLIGRAM(S): 4 GUM, CHEWING ORAL at 17:52

## 2025-05-28 RX ADMIN — NICOTINE POLACRILEX 2 MILLIGRAM(S): 4 GUM, CHEWING ORAL at 20:55

## 2025-05-28 RX ADMIN — NICOTINE POLACRILEX 2 MILLIGRAM(S): 4 GUM, CHEWING ORAL at 11:38

## 2025-05-28 NOTE — BH INPATIENT PSYCHIATRY ASSESSMENT NOTE - NSBHCHARTREVIEWVS_PSY_A_CORE FT
Vital Signs Last 24 Hrs  T(C): 37.1 (05-28-25 @ 03:32), Max: 37.1 (05-28-25 @ 03:32)  T(F): 98.8 (05-28-25 @ 03:32), Max: 98.8 (05-28-25 @ 03:32)  HR: 87 (05-27-25 @ 22:24) (87 - 88)  BP: 125/88 (05-27-25 @ 22:24) (125/88 - 127/85)  BP(mean): --  RR: 15 (05-27-25 @ 22:24) (15 - 15)  SpO2: 98% (05-27-25 @ 22:24) (97% - 98%)    Orthostatic VS  05-28-25 @ 03:32  Lying BP: --/-- HR: --  Sitting BP: 120/90 HR: 98  Standing BP: 116/90 HR: 101  Site: upper left arm  Mode: electronic

## 2025-05-28 NOTE — BH INPATIENT PSYCHIATRY ASSESSMENT NOTE - NSTXDEPRESDATETRGT_PSY_ALL_CORE
Assess pt's skin with Alice STONE.  Pt has bruising to bilateral arms and bilateral dorsal hands. Bruising to left shoulder blade.  Generalized dry flaky skin to whole body.   04-Jun-2025

## 2025-05-28 NOTE — ED BEHAVIORAL HEALTH ASSESSMENT NOTE - DESCRIPTION
single, domiciled with brother, unemployed, college graduated L4-L5 hemilaminectomy No acute behavioral events since arrival to the ED. No PRN medications used.    ICU Vital Signs Last 24 Hrs  T(C): 36.3 (27 May 2025 22:24), Max: 36.8 (27 May 2025 21:01)  T(F): 97.3 (27 May 2025 22:24), Max: 98.2 (27 May 2025 21:01)  HR: 87 (27 May 2025 22:24) (87 - 88)  BP: 125/88 (27 May 2025 22:24) (125/88 - 127/85)  RR: 15 (27 May 2025 22:24) (15 - 15)  SpO2: 98% (27 May 2025 22:24) (97% - 98%) on RA    Labs pending

## 2025-05-28 NOTE — ED BEHAVIORAL HEALTH ASSESSMENT NOTE - RISK ASSESSMENT
Protective factors include: young, identifies reasons for living, future oriented, strong social supports, positive therapeutic relationship, engaged in treatment, medication/ follow up compliance, help-seeking behavior, adequate outpatient follow up with motivation to participate in care.  Risk factors include: depression, SA, completed suicide in father

## 2025-05-28 NOTE — BH INPATIENT PSYCHIATRY ASSESSMENT NOTE - OTHER PAST PSYCHIATRIC HISTORY (INCLUDE DETAILS REGARDING ONSET, COURSE OF ILLNESS, INPATIENT/OUTPATIENT TREATMENT)
2 psychiatric hospitalizations (2017 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

## 2025-05-28 NOTE — BH PATIENT PROFILE - HOME MEDICATIONS
Medrol Dosepak 4 mg oral tablet , 1 dose(s) orally once a day Take as instructed on packet for duration.  Peridex 0.12% mucous membrane liquid , 15 milliliter(s) orally 2 times a day  amoxicillin-clavulanate 875 mg-125 mg oral tablet , 875 milligram(s) orally 2 times a day  Outpatient physical therapy   dexamethasone 2 mg oral tablet , 4 tab(s) orally once a day x 2 days  3 tab(s) orally once a day x 2 days  2 tab(s) orally once a day x 2 days  1 tab(s) orally once a day x 2 days  acetaminophen 325 mg oral tablet , 2 tab(s) orally every 6 hours, As needed, Temp greater or equal to 38.5C (101.3F), Mild Pain (1 - 3)  lamoTRIgine 25 mg oral tablet, extended release , 1 tab(s) orally once a day (at bedtime)  tiZANidine 2 mg oral tablet , 1 tab(s) orally , As Needed  NOTE: last dispensed December 2022  escitalopram 10 mg oral tablet , 1 tab(s) orally once a day (at bedtime). total daily dose 15mg.  escitalopram 5 mg oral tablet , 1 tab(s) orally once a day. total daily dose 15mg.  traZODone 50 mg oral tablet , 1 tab(s) orally once a day (at bedtime)

## 2025-05-28 NOTE — BH INPATIENT PSYCHIATRY ASSESSMENT NOTE - DESCRIPTION
single, domiciled with brother, unemployed, college graduated single, domiciled with brother, unemployed
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
## 2025-05-28 NOTE — BH INPATIENT PSYCHIATRY ASSESSMENT NOTE - ADDITIONAL DETAILS ALL
See HPI, hx of aborted SA (attempted to jump off a bridge after father's suicide but ultimately aborted the attempt)
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
## 2025-05-28 NOTE — ED BEHAVIORAL HEALTH ASSESSMENT NOTE - OTHER PAST PSYCHIATRIC HISTORY (INCLUDE DETAILS REGARDING ONSET, COURSE OF ILLNESS, INPATIENT/OUTPATIENT TREATMENT)
2 psychiatric hospitalizations (2017 [facility] for MDD, 2021 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

## 2025-05-28 NOTE — BH INPATIENT PSYCHIATRY ASSESSMENT NOTE - CURRENT MEDICATION
MEDICATIONS  (STANDING):    MEDICATIONS  (PRN):  acetaminophen     Tablet .. 650 milliGRAM(s) Oral every 6 hours PRN Mild Pain (1 - 3), Moderate Pain (4 - 6)  LORazepam     Tablet 2 milliGRAM(s) Oral every 6 hours PRN Agitation  LORazepam   Injectable 2 milliGRAM(s) IntraMuscular Once PRN Severe Agitation  nicotine  Polacrilex Gum 2 milliGRAM(s) Oral every 3 hours PRN Smoking Cessation

## 2025-05-28 NOTE — ED BEHAVIORAL HEALTH ASSESSMENT NOTE - DETAILS
Triny on Abilify See HPI Handoff provided to JACQUIE Father completed suicide in April 2022 Reports mild back pain discussed with  ED team
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
## 2025-05-28 NOTE — ED BEHAVIORAL HEALTH ASSESSMENT NOTE - PAST PSYCHOTROPIC MEDICATION
Bupropion (stopped 1m ago); Seroquel (no effect); Prozac (no response); Abilify (stopped d/t Akathisia); Lexapro; Adderall; Lamictal; Trazodone
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
## 2025-05-28 NOTE — ED BEHAVIORAL HEALTH ASSESSMENT NOTE - ADDITIONAL DETAILS ALL
Patient aware and voiced understanding, no concerns voiced at this time. See HPI See HPI, hx of aborted SA (attempted to jump off a bridge after father's suicide but ultimately aborted the attempt)

## 2025-05-28 NOTE — BH INPATIENT PSYCHIATRY ASSESSMENT NOTE - NSBHMETABOLIC_PSY_ALL_CORE_FT
BMI: BMI (kg/m2): 21.5 (05-28-25 @ 03:32)  HbA1c:   Glucose:   BP: 125/88 (05-27-25 @ 22:24) (125/88 - 127/85)Vital Signs Last 24 Hrs  T(C): 37.1 (05-28-25 @ 03:32), Max: 37.1 (05-28-25 @ 03:32)  T(F): 98.8 (05-28-25 @ 03:32), Max: 98.8 (05-28-25 @ 03:32)  HR: 87 (05-27-25 @ 22:24) (87 - 88)  BP: 125/88 (05-27-25 @ 22:24) (125/88 - 127/85)  BP(mean): --  RR: 15 (05-27-25 @ 22:24) (15 - 15)  SpO2: 98% (05-27-25 @ 22:24) (97% - 98%)    Orthostatic VS  05-28-25 @ 03:32  Lying BP: --/-- HR: --  Sitting BP: 120/90 HR: 98  Standing BP: 116/90 HR: 101  Site: upper left arm  Mode: electronic    Lipid Panel:

## 2025-05-28 NOTE — ED ADULT NURSE REASSESSMENT NOTE - NS ED NURSE REASSESS COMMENT FT1
break coverage rn. received report from JOSE DAVID verde. pt A&Ox4, vitally stable. pt tearful and yelling at times due to life stressors going on. pt aware to be transported with security to [facility]. pt redirectable and made aware in safe place. safety maintained

## 2025-05-28 NOTE — BH INPATIENT PSYCHIATRY ASSESSMENT NOTE - MSE UNSTRUCTURED FT
Appearance: Dressed appropriately.  Attitude / Behavior / relatedness: Calm, cooperative, tearful at times   Eye contact: fair  Motor: No abnormal movements, no psychomotor slowing or activation.  Speech: Regular rate. Expansive.  Mood: "not good"  Affect: somewhat labile, tearful  Thought Process: vague at times, some flight of ideas, tangentiality  Thought Content: endorses passive SI, denies HI,   Perceptual: denies  Insight: poor  Judgment: poor  Impulse control: good   Cognition: grossly intact  Gait: Intact.

## 2025-05-28 NOTE — BH INPATIENT PSYCHIATRY ASSESSMENT NOTE - RISK ASSESSMENT
Risk factors for self-harm / harm to others: psychiatric diagnosis,  prior SA, completed suicide in father  Protective factors: future oriented, help seeking behaviors, inpatient and in a controlled setting with limited access to lethal means. not endorsing active harm to self or others, able to contract for safety.

## 2025-05-28 NOTE — ED BEHAVIORAL HEALTH ASSESSMENT NOTE - NS ED BHA PLAN ADMIT TO PSYCHIATRY BH CONTACTED FT
See ED Behavioral Health Note for collateral information from pt's private psychotherapist Sandra Cruz

## 2025-05-28 NOTE — BH PSYCHOLOGY - GROUP THERAPY NOTE - NSPSYCHOLGRPGENPT_PSY_A_CORE FT
Patient attended the cognitive behavior therapy group session. Group session focused on the topic of changing patterns of limited thinking.  Discussion revolved around the eight different patterns as well as identifying the group's recognition of their own patterns of limited thinking.  An exercise matching statements and the patterns of limited thinking was utilized to reinforce the practical application of identifying the different patterns in daily life. Group expectations and guidelines, as well as confidentiality and its limitations, were addressed. Principles of cognitive behavior therapy related to today's group topic were reviewed and discussed.  Group members illustrated understanding of these concepts by giving personal examples based on their current experiences. Discussions stemmed from the group topics to the causal connection between thoughts, feelings, and behaviors.

## 2025-05-28 NOTE — PSYCHIATRIC REHAB INITIAL EVALUATION - NSBHPRRECOMMEND_PSY_ALL_CORE
Writer attempted to meet with patient to orient patient to unit ML3 as well as the role/function of Activities Specialists and Inpatient Psychiatric Rehabilitation programming. During the attempt to engage with the patient due to patient’s presenting symptoms of anxiety and panic attacks. These symptoms currently hinder staff from conducting and appropriate evaluation to establish a suitable rehabilitation goal. Due to presenting symptoms, patient is unable to identify an appropriate rehabilitation goal within the context of presenting symptoms. Based on patient’s health records, writer identified an appropriate goal for patient defined in the behavioral health plan of care as take all medication as prescribed. Will reassess goal with patient at later date. Psychiatric Rehabilitation staff will meet with patient weekly to review progress towards identified goal.
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
## 2025-05-29 PROCEDURE — 99232 SBSQ HOSP IP/OBS MODERATE 35: CPT

## 2025-05-29 RX ORDER — RISPERIDONE 4 MG
1 TABLET ORAL AT BEDTIME
Refills: 0 | Status: DISCONTINUED | OUTPATIENT
Start: 2025-05-29 | End: 2025-05-30

## 2025-05-29 RX ORDER — HYDROXYZINE HYDROCHLORIDE 25 MG/1
50 TABLET, FILM COATED ORAL ONCE
Refills: 0 | Status: COMPLETED | OUTPATIENT
Start: 2025-05-29 | End: 2025-05-29

## 2025-05-29 RX ADMIN — Medication 2 MILLIGRAM(S): at 12:37

## 2025-05-29 RX ADMIN — Medication 650 MILLIGRAM(S): at 21:39

## 2025-05-29 RX ADMIN — Medication 650 MILLIGRAM(S): at 00:34

## 2025-05-29 RX ADMIN — Medication 2 MILLIGRAM(S): at 00:34

## 2025-05-29 RX ADMIN — HYDROXYZINE HYDROCHLORIDE 50 MILLIGRAM(S): 25 TABLET, FILM COATED ORAL at 21:39

## 2025-05-29 NOTE — BH SOCIAL WORK INITIAL PSYCHOSOCIAL EVALUATION - NSBHABUSEAPSFT_PSY_ALL_CORE
History of CPS involvement in home. Patient states after discharge from last hospitalization CPS was called.

## 2025-05-29 NOTE — BH INPATIENT PSYCHIATRY PROGRESS NOTE - CURRENT MEDICATION
MEDICATIONS  (STANDING):  risperiDONE   Tablet 1 milliGRAM(s) Oral at bedtime    MEDICATIONS  (PRN):  acetaminophen     Tablet .. 650 milliGRAM(s) Oral every 6 hours PRN Mild Pain (1 - 3), Moderate Pain (4 - 6)  LORazepam     Tablet 2 milliGRAM(s) Oral every 6 hours PRN Agitation  LORazepam   Injectable 2 milliGRAM(s) IntraMuscular Once PRN Severe Agitation  nicotine  Polacrilex Gum 2 milliGRAM(s) Oral every 3 hours PRN Smoking Cessation
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
## 2025-05-29 NOTE — BH INPATIENT PSYCHIATRY PROGRESS NOTE - PRN MEDS
MEDICATIONS  (PRN):  acetaminophen     Tablet .. 650 milliGRAM(s) Oral every 6 hours PRN Mild Pain (1 - 3), Moderate Pain (4 - 6)  LORazepam     Tablet 2 milliGRAM(s) Oral every 6 hours PRN Agitation  LORazepam   Injectable 2 milliGRAM(s) IntraMuscular Once PRN Severe Agitation  nicotine  Polacrilex Gum 2 milliGRAM(s) Oral every 3 hours PRN Smoking Cessation

## 2025-05-29 NOTE — BH INPATIENT PSYCHIATRY PROGRESS NOTE - NSBHCHARTREVIEWVS_PSY_A_CORE FT
Vital Signs Last 24 Hrs  T(C): 36.8 (05-28-25 @ 18:49), Max: 36.8 (05-28-25 @ 18:49)  T(F): 98.2 (05-28-25 @ 18:49), Max: 98.2 (05-28-25 @ 18:49)  HR: --  BP: --  BP(mean): --  RR: --  SpO2: --    Orthostatic VS  05-28-25 @ 18:49  Lying BP: --/-- HR: --  Sitting BP: 117/72 HR: 89  Standing BP: 114/73 HR: 96  Site: --  Mode: --  Orthostatic VS  05-28-25 @ 03:32  Lying BP: --/-- HR: --  Sitting BP: 120/90 HR: 98  Standing BP: 116/90 HR: 101  Site: upper left arm  Mode: electronic
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
## 2025-05-29 NOTE — BH TREATMENT PLAN - NSTXMEDICINTERPR_PSY_ALL_CORE
Writer attempted to meet with patient to orient patient to unit ML3 as well as the role/function of Activities Specialists and Inpatient Psychiatric Rehabilitation programming. During the attempt to engage with the patient due to patient’s presenting symptoms of anxiety. These symptoms currently hinder staff from conducting and appropriate evaluation to establish a suitable rehabilitation goal. Due to presenting symptoms, patient is unable to identify an appropriate rehabilitation goal within the context of presenting symptoms. Based on patient’s health records, writer identified an appropriate goal for patient defined in the behavioral health plan of care as take all medication as prescribed. Will reassess goal with patient at later date. Psychiatric Rehabilitation staff will meet with patient weekly to review progress towards identified goal.

## 2025-05-29 NOTE — BH TREATMENT PLAN - NSTXPROBCOPE_PSY_ALL_CORE
Scheduled with Ashlee 9.24.2021 for BP check    Patient states BP running a little high   COPING, INEFFECTIVE

## 2025-05-29 NOTE — BH INPATIENT PSYCHIATRY PROGRESS NOTE - MSE UNSTRUCTURED FT
Appearance: Dressed appropriately.  Attitude / Behavior / relatedness: Calm, cooperative, tearful at times   Eye contact: fair  Motor: No abnormal movements, no psychomotor slowing or activation.  Speech: Regular rate. Expansive.  Mood: "not good"  Affect: somewhat labile, tearful  Thought Process: vague at times, some flight of ideas, tangentiality  Thought Content: endorses passive SI, denies HI,   Perceptual: denies  Insight: poor  Judgment: poor  Impulse control: good   Cognition: grossly intact  Gait: Intact.   Appearance: Dressed appropriately.  Attitude / Behavior / relatedness: oddly related, appears to be pretending to sleep at times, tearful at times   Eye contact: fair  Motor: No abnormal movements, no psychomotor slowing or activation.  Speech: Regular rate. Expansive.  Mood: "not good"  Affect: labile, tearful  Thought Process: vague at times, some flight of ideas, tangentiality  Thought Content: endorses passive SI, denies HI, +paranoia, ?delusions   Perceptual: denies  Insight: poor  Judgment: poor  Impulse control: good   Cognition: grossly intact  Gait: Intact.
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
## 2025-05-29 NOTE — BH INPATIENT PSYCHIATRY PROGRESS NOTE - NSBHMETABOLIC_PSY_ALL_CORE_FT
BMI: BMI (kg/m2): 21.5 (05-28-25 @ 03:32)  HbA1c:   Glucose:   BP: 125/88 (05-27-25 @ 22:24) (125/88 - 127/85)Vital Signs Last 24 Hrs  T(C): 36.8 (05-28-25 @ 18:49), Max: 36.8 (05-28-25 @ 18:49)  T(F): 98.2 (05-28-25 @ 18:49), Max: 98.2 (05-28-25 @ 18:49)  HR: --  BP: --  BP(mean): --  RR: --  SpO2: --    Orthostatic VS  05-28-25 @ 18:49  Lying BP: --/-- HR: --  Sitting BP: 117/72 HR: 89  Standing BP: 114/73 HR: 96  Site: --  Mode: --  Orthostatic VS  05-28-25 @ 03:32  Lying BP: --/-- HR: --  Sitting BP: 120/90 HR: 98  Standing BP: 116/90 HR: 101  Site: upper left arm  Mode: electronic    Lipid Panel:

## 2025-05-30 PROCEDURE — 99232 SBSQ HOSP IP/OBS MODERATE 35: CPT

## 2025-05-30 PROCEDURE — 90853 GROUP PSYCHOTHERAPY: CPT

## 2025-05-30 RX ORDER — HYDROXYZINE HYDROCHLORIDE 25 MG/1
25 TABLET, FILM COATED ORAL THREE TIMES A DAY
Refills: 0 | Status: DISCONTINUED | OUTPATIENT
Start: 2025-05-30 | End: 2025-06-06

## 2025-05-30 RX ORDER — IBUPROFEN 200 MG
600 TABLET ORAL EVERY 6 HOURS
Refills: 0 | Status: DISCONTINUED | OUTPATIENT
Start: 2025-05-30 | End: 2025-06-02

## 2025-05-30 RX ORDER — POLYETHYLENE GLYCOL 3350 17 G/17G
17 POWDER, FOR SOLUTION ORAL DAILY
Refills: 0 | Status: DISCONTINUED | OUTPATIENT
Start: 2025-05-30 | End: 2025-06-06

## 2025-05-30 RX ADMIN — NICOTINE POLACRILEX 2 MILLIGRAM(S): 4 GUM, CHEWING ORAL at 14:19

## 2025-05-30 RX ADMIN — NICOTINE POLACRILEX 2 MILLIGRAM(S): 4 GUM, CHEWING ORAL at 17:40

## 2025-05-30 RX ADMIN — POLYETHYLENE GLYCOL 3350 17 GRAM(S): 17 POWDER, FOR SOLUTION ORAL at 14:19

## 2025-05-30 RX ADMIN — NICOTINE POLACRILEX 2 MILLIGRAM(S): 4 GUM, CHEWING ORAL at 08:54

## 2025-05-30 RX ADMIN — Medication 650 MILLIGRAM(S): at 00:20
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
## 2025-05-30 NOTE — BH INPATIENT PSYCHIATRY PROGRESS NOTE - MSE UNSTRUCTURED FT
Appearance: Dressed appropriately.  Attitude / Behavior / relatedness: Calm, cooperative, somewhat oddly related at times  Eye contact: fair  Motor: No abnormal movements, no psychomotor slowing or activation.  Speech: Regular rate. Expansive.  Mood: "not good"  Affect: somewhat labile (less so compared to yesterday)  Thought Process: mostly linear, logical today, less perseverative/tangential today  Thought Content: endorses passive SI, denies HI, +paranoia, ?delusions   Perceptual: denies AVH, does not appear internally preoccupied  Insight: poor  Judgment: poor  Impulse control: good   Cognition: grossly intact  Gait: Intact.

## 2025-05-30 NOTE — BH INPATIENT PSYCHIATRY PROGRESS NOTE - PRN MEDS
MEDICATIONS  (PRN):  acetaminophen     Tablet .. 650 milliGRAM(s) Oral every 6 hours PRN Mild Pain (1 - 3), Moderate Pain (4 - 6)  hydrOXYzine hydrochloride 25 milliGRAM(s) Oral three times a day PRN anxiety  ibuprofen  Tablet. 600 milliGRAM(s) Oral every 6 hours PRN Mild Pain (1 - 3), Moderate Pain (4 - 6)  LORazepam     Tablet 2 milliGRAM(s) Oral every 6 hours PRN Agitation  LORazepam   Injectable 2 milliGRAM(s) IntraMuscular Once PRN Severe Agitation  nicotine  Polacrilex Gum 2 milliGRAM(s) Oral every 3 hours PRN Smoking Cessation  polyethylene glycol 3350 17 Gram(s) Oral daily PRN constipation
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
## 2025-05-30 NOTE — BH INPATIENT PSYCHIATRY PROGRESS NOTE - CURRENT MEDICATION
MEDICATIONS  (STANDING):    MEDICATIONS  (PRN):  acetaminophen     Tablet .. 650 milliGRAM(s) Oral every 6 hours PRN Mild Pain (1 - 3), Moderate Pain (4 - 6)  hydrOXYzine hydrochloride 25 milliGRAM(s) Oral three times a day PRN anxiety  ibuprofen  Tablet. 600 milliGRAM(s) Oral every 6 hours PRN Mild Pain (1 - 3), Moderate Pain (4 - 6)  LORazepam     Tablet 2 milliGRAM(s) Oral every 6 hours PRN Agitation  LORazepam   Injectable 2 milliGRAM(s) IntraMuscular Once PRN Severe Agitation  nicotine  Polacrilex Gum 2 milliGRAM(s) Oral every 3 hours PRN Smoking Cessation  polyethylene glycol 3350 17 Gram(s) Oral daily PRN constipation
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
## 2025-05-30 NOTE — BH INPATIENT PSYCHIATRY PROGRESS NOTE - NSBHCHARTREVIEWVS_PSY_A_CORE FT
Vital Signs Last 24 Hrs  T(C): 36.8 (05-30-25 @ 08:19), Max: 36.8 (05-30-25 @ 08:19)  T(F): 98.2 (05-30-25 @ 08:19), Max: 98.2 (05-30-25 @ 08:19)  HR: --  BP: --  BP(mean): --  RR: --  SpO2: --    Orthostatic VS  05-30-25 @ 08:19  Lying BP: --/-- HR: --  Sitting BP: 101/68 HR: 99  Standing BP: 107/70 HR: 102  Site: --  Mode: --  Orthostatic VS  05-28-25 @ 18:49  Lying BP: --/-- HR: --  Sitting BP: 117/72 HR: 89  Standing BP: 114/73 HR: 96  Site: --  Mode: --

## 2025-05-30 NOTE — BH INPATIENT PSYCHIATRY PROGRESS NOTE - NSBHMETABOLIC_PSY_ALL_CORE_FT
BMI: BMI (kg/m2): 21.8 (09-22-24 @ 21:25)  HbA1c:   Glucose:   BP: 125/88 (05-27-25 @ 22:24) (125/88 - 127/85)Vital Signs Last 24 Hrs  T(C): 36.8 (05-30-25 @ 08:19), Max: 36.8 (05-30-25 @ 08:19)  T(F): 98.2 (05-30-25 @ 08:19), Max: 98.2 (05-30-25 @ 08:19)  HR: --  BP: --  BP(mean): --  RR: --  SpO2: --    Orthostatic VS  05-30-25 @ 08:19  Lying BP: --/-- HR: --  Sitting BP: 101/68 HR: 99  Standing BP: 107/70 HR: 102  Site: --  Mode: --  Orthostatic VS  05-28-25 @ 18:49  Lying BP: --/-- HR: --  Sitting BP: 117/72 HR: 89  Standing BP: 114/73 HR: 96  Site: --  Mode: --    Lipid Panel:

## 2025-05-30 NOTE — BH PSYCHOLOGY - GROUP THERAPY NOTE - NSPSYCHOLGRPGENPT_PSY_A_CORE FT
Patient attended the cognitive behavior therapy group session. Group session focused on the topic of problem solving.  Discussion revolved around the identification of problems, exploring the maximization of benefits while reducing the cost of negative consequences as well as tangible strategies to solving problems.  Group expectations and guidelines, as well as confidentiality and its limitations, were addressed. Principles of cognitive behavior therapy related to today's group topic were reviewed and discussed. Group members illustrated understanding of these concepts by giving personal examples based on their current experiences. Discussions stemmed from the group topics to the causal connection between thoughts, feelings, and behaviors.

## 2025-05-30 NOTE — BH PSYCHOLOGY - GROUP THERAPY NOTE - TOKEN PULL-DIAGNOSIS
Primary Diagnosis:  Bipolar disorder [F31.9]      Depression [F32.A]        Problem Dx:   Trauma [T14.90XA]      Depression [F32.A]      
Primary Diagnosis:    Problem Dx:   Trauma [T14.90XA]      Depression [F32.A]

## 2025-05-31 PROCEDURE — 99232 SBSQ HOSP IP/OBS MODERATE 35: CPT

## 2025-05-31 RX ADMIN — HYDROXYZINE HYDROCHLORIDE 25 MILLIGRAM(S): 25 TABLET, FILM COATED ORAL at 23:37

## 2025-05-31 RX ADMIN — HYDROXYZINE HYDROCHLORIDE 25 MILLIGRAM(S): 25 TABLET, FILM COATED ORAL at 00:02

## 2025-05-31 RX ADMIN — NICOTINE POLACRILEX 2 MILLIGRAM(S): 4 GUM, CHEWING ORAL at 09:34

## 2025-05-31 RX ADMIN — NICOTINE POLACRILEX 2 MILLIGRAM(S): 4 GUM, CHEWING ORAL at 13:24

## 2025-05-31 RX ADMIN — NICOTINE POLACRILEX 2 MILLIGRAM(S): 4 GUM, CHEWING ORAL at 21:51

## 2025-05-31 RX ADMIN — POLYETHYLENE GLYCOL 3350 17 GRAM(S): 17 POWDER, FOR SOLUTION ORAL at 09:22

## 2025-05-31 RX ADMIN — NICOTINE POLACRILEX 2 MILLIGRAM(S): 4 GUM, CHEWING ORAL at 00:15
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
## 2025-05-31 NOTE — BH INPATIENT PSYCHIATRY PROGRESS NOTE - NSBHMETABOLIC_PSY_ALL_CORE_FT
BMI: BMI (kg/m2): 21.8 (09-22-24 @ 21:25)  HbA1c:   Glucose:   BP: --Vital Signs Last 24 Hrs  T(C): 36.3 (05-31-25 @ 08:51), Max: 36.3 (05-31-25 @ 08:51)  T(F): 97.4 (05-31-25 @ 08:51), Max: 97.4 (05-31-25 @ 08:51)  HR: --  BP: --  BP(mean): --  RR: --  SpO2: --    Orthostatic VS  05-31-25 @ 08:51  Lying BP: --/-- HR: --  Sitting BP: 118/84 HR: 114  Standing BP: 129/77 HR: 116  Site: --  Mode: --  Orthostatic VS  05-30-25 @ 08:19  Lying BP: --/-- HR: --  Sitting BP: 101/68 HR: 99  Standing BP: 107/70 HR: 102  Site: --  Mode: --    Lipid Panel:

## 2025-05-31 NOTE — BH INPATIENT PSYCHIATRY PROGRESS NOTE - NSBHCHARTREVIEWVS_PSY_A_CORE FT
Vital Signs Last 24 Hrs  T(C): 36.3 (05-31-25 @ 08:51), Max: 36.3 (05-31-25 @ 08:51)  T(F): 97.4 (05-31-25 @ 08:51), Max: 97.4 (05-31-25 @ 08:51)  HR: --  BP: --  BP(mean): --  RR: --  SpO2: --    Orthostatic VS  05-31-25 @ 08:51  Lying BP: --/-- HR: --  Sitting BP: 118/84 HR: 114  Standing BP: 129/77 HR: 116  Site: --  Mode: --  Orthostatic VS  05-30-25 @ 08:19  Lying BP: --/-- HR: --  Sitting BP: 101/68 HR: 99  Standing BP: 107/70 HR: 102  Site: --  Mode: --

## 2025-05-31 NOTE — BH INPATIENT PSYCHIATRY PROGRESS NOTE - MSE UNSTRUCTURED FT
Appearance: Dressed appropriately.  Attitude / Behavior / relatedness: cooperative; at times tense   Eye contact: fair  Motor: No abnormal movements, no psychomotor slowing or activation.  Speech: Regular rate. Expansive.  Mood: "not good"  Affect: irritable   Thought Process: linear   Thought Content:  no suicidal ideation or hi. paranoid undertone may be present   Perceptual: denies AVH, does not appear internally preoccupied  Insight: poor  Judgment: poor  Impulse control: good on the unit   Cognition: grossly intact  Gait: Intact.

## 2025-06-01 PROCEDURE — 99232 SBSQ HOSP IP/OBS MODERATE 35: CPT

## 2025-06-01 RX ORDER — LORAZEPAM 4 MG/ML
1 VIAL (ML) INJECTION ONCE
Refills: 0 | Status: DISCONTINUED | OUTPATIENT
Start: 2025-06-01 | End: 2025-06-01

## 2025-06-01 RX ORDER — MAGNESIUM HYDROXIDE 400 MG/5ML
30 SUSPENSION ORAL DAILY
Refills: 0 | Status: DISCONTINUED | OUTPATIENT
Start: 2025-06-01 | End: 2025-06-06

## 2025-06-01 RX ADMIN — NICOTINE POLACRILEX 2 MILLIGRAM(S): 4 GUM, CHEWING ORAL at 17:36

## 2025-06-01 RX ADMIN — NICOTINE POLACRILEX 2 MILLIGRAM(S): 4 GUM, CHEWING ORAL at 08:56

## 2025-06-01 RX ADMIN — Medication 1 MILLIGRAM(S): at 10:59

## 2025-06-01 RX ADMIN — MAGNESIUM HYDROXIDE 30 MILLILITER(S): 400 SUSPENSION ORAL at 10:59

## 2025-06-01 RX ADMIN — HYDROXYZINE HYDROCHLORIDE 25 MILLIGRAM(S): 25 TABLET, FILM COATED ORAL at 08:56

## 2025-06-01 RX ADMIN — NICOTINE POLACRILEX 2 MILLIGRAM(S): 4 GUM, CHEWING ORAL at 20:25

## 2025-06-01 NOTE — BH INPATIENT PSYCHIATRY PROGRESS NOTE - PRN MEDS
MEDICATIONS  (PRN):  acetaminophen     Tablet .. 650 milliGRAM(s) Oral every 6 hours PRN Mild Pain (1 - 3), Moderate Pain (4 - 6)  hydrOXYzine hydrochloride 25 milliGRAM(s) Oral three times a day PRN anxiety  ibuprofen  Tablet. 600 milliGRAM(s) Oral every 6 hours PRN Mild Pain (1 - 3), Moderate Pain (4 - 6)  LORazepam     Tablet 2 milliGRAM(s) Oral every 6 hours PRN Agitation  LORazepam   Injectable 2 milliGRAM(s) IntraMuscular Once PRN Severe Agitation  magnesium hydroxide Suspension 30 milliLiter(s) Oral daily PRN Constipation  nicotine  Polacrilex Gum 2 milliGRAM(s) Oral every 3 hours PRN Smoking Cessation  polyethylene glycol 3350 17 Gram(s) Oral daily PRN constipation

## 2025-06-01 NOTE — BH CHART NOTE - NSEVENTNOTEFT_PSY_ALL_CORE
JACQUIE paged at 1030 for somatic symptoms. Per nursing report, reporting of incontinence and constipation. Pt tearful and tangential during interview. When asked about current symptoms, pt endorses incontinence and constipation that has been persisting for the past 2 weeks. Of note,  these symptoms were not endorsed to previous  providers during this admission.   		  Reports hx of laminectomy at age 16. Per chart review has hx of laminectomy due to disc herniation L4-L5 and prior caudal equine syndrome. Per medical discharge note on 3/20/23, pt also with unclear differential related to b/l LE weakness that occurred during this time.      Endorses chronic hx of back pain, unchanged at this time. Also endorses chronic hx of numbness in groin, unchanged at this time. Reports that numbness improves with masturbation. Ambulating as normal w/o b/l LE weakness.    A/P: Pt with multiple somatic symptoms I/s/o complex neurological history. Cauda equine syndrome not likely at this time.   Will order milk of magnesium for constipation. Tylenol PRN for pain. Ativan 1 mg PO x1 ordered for anxiety. Will consult medicine for full medical w/u.  JACQUIE paged at 1030 for somatic symptoms. Per nursing report, reporting of incontinence and constipation. Pt tearful and tangential during interview. When asked about current symptoms, pt endorses incontinence and constipation that has been persisting for the past 2 weeks. Of note,  these symptoms were not endorsed to previous  providers during this admission.   		  Reports hx of laminectomy at age 16. Per chart review has hx of laminectomy due to disc herniation L4-L5 and prior caudal equine syndrome. Per medical discharge note on 3/20/23, pt also with unclear differential related to b/l LE weakness that occurred during this time.      Endorses chronic hx of back pain, unchanged at this time. Also endorses chronic hx of numbness in groin, unchanged at this time. Reports that numbness improves with masturbation. Ambulating as normal w/o b/l LE weakness. Last urinated this morning.    A/P: Pt with multiple somatic symptoms I/s/o complex neurological history. Cauda equine syndrome not likely at this time.   Will order milk of magnesium for constipation. Tylenol PRN for pain. Ativan 1 mg PO x1 ordered for anxiety. Will consult medicine for full medical w/u.  JACQUIE paged at 1030 for somatic symptoms. Per nursing report, reporting of incontinence and constipation. Pt tearful and tangential during interview. When asked about current symptoms, pt endorses incontinence and constipation that has been persisting for the past 2 weeks. On chart review, these symptoms were not endorsed to previous  providers this admission. Reports hx of laminectomy at age 16. Per chart review has hx of laminectomy due to disc herniation L4-L5 and prior caudal equine syndrome. Per medical discharge note on 3/20/23, pt also with unclear differential related to b/l LE weakness. Endorses chronic hx of back pain, unchanged at this time. Also endorses chronic hx of numbness in groin, unchanged at this time. Reports that numbness improves with masturbation. Ambulating as normal. Last urinated this AM. A/P: Pt with multiple somatic symptoms I/s/o complex neurological history (hx of cauda equina syndrome, hx of documented possible conversion disorder) . Cauda equine syndrome unlikely at this time. Will order milk of magnesium for constipation. Tylenol PRN for pain. Ativan 1 mg PO x1 ordered for anxiety. Will consult medicine and/or neurology for full evaluation. JACQUIE paged at 1030 for somatic symptoms. Per nursing report, reporting of incontinence and constipation. Pt tearful and tangential during interview. When asked about current symptoms, pt endorses incontinence and constipation that has been persisting for the past 2 weeks. On chart review, these symptoms were not endorsed to previous  providers this admission. Reports hx of laminectomy at age 16. Per chart review has hx of laminectomy due to disc herniation L4-L5 and prior caudal equine syndrome. Per medical discharge note on 3/20/23, pt also with unclear differential related to b/l LE weakness. Endorses chronic hx of back pain, unchanged at this time. Also endorses chronic hx of numbness in groin, unchanged at this time. Reports that numbness improves with masturbation. Ambulating as normal. Last urinated this AM.     A/P: Pt with multiple somatic symptoms I/s/o complex neurological history (hx of cauda equina syndrome, hx of documented possible conversion disorder.   -Cauda equine syndrome unlikely at this time.   -Will order milk of magnesium for constipation.   -Tylenol PRN for pain.   -Ativan 1 mg PO x1 ordered for anxiety.   -Please consult medicine vs neurology for full w/u.

## 2025-06-01 NOTE — BH INPATIENT PSYCHIATRY PROGRESS NOTE - NSBHFUPINTERVALHXFT_PSY_A_CORE
Patient slept through the night according to sleep log. she is more tearful today. She speaks about various topics loosely tied to the theme of abandonment, feeling invalidated, and loss of control in relationships with her family. she reports she took PRN Atarax this AM due to anxiety related to this. denies SI

## 2025-06-01 NOTE — BH INPATIENT PSYCHIATRY PROGRESS NOTE - NSBHCHARTREVIEWVS_PSY_A_CORE FT
Vital Signs Last 24 Hrs  T(C): 36.9 (06-01-25 @ 08:53), Max: 36.9 (06-01-25 @ 08:53)  T(F): 98.4 (06-01-25 @ 08:53), Max: 98.4 (06-01-25 @ 08:53)  HR: --  BP: --  BP(mean): --  RR: --  SpO2: --    Orthostatic VS  06-01-25 @ 08:53  Lying BP: --/-- HR: --  Sitting BP: 102/74 HR: 97  Standing BP: 96/76 HR: 83  Site: --  Mode: --  Orthostatic VS  05-31-25 @ 08:51  Lying BP: --/-- HR: --  Sitting BP: 118/84 HR: 114  Standing BP: 129/77 HR: 116  Site: --  Mode: --

## 2025-06-01 NOTE — BH INPATIENT PSYCHIATRY PROGRESS NOTE - NSBHMETABOLIC_PSY_ALL_CORE_FT
BMI: BMI (kg/m2): 21.8 (09-22-24 @ 21:25)  HbA1c:   Glucose:   BP: --Vital Signs Last 24 Hrs  T(C): 36.9 (06-01-25 @ 08:53), Max: 36.9 (06-01-25 @ 08:53)  T(F): 98.4 (06-01-25 @ 08:53), Max: 98.4 (06-01-25 @ 08:53)  HR: --  BP: --  BP(mean): --  RR: --  SpO2: --    Orthostatic VS  06-01-25 @ 08:53  Lying BP: --/-- HR: --  Sitting BP: 102/74 HR: 97  Standing BP: 96/76 HR: 83  Site: --  Mode: --  Orthostatic VS  05-31-25 @ 08:51  Lying BP: --/-- HR: --  Sitting BP: 118/84 HR: 114  Standing BP: 129/77 HR: 116  Site: --  Mode: --    Lipid Panel:

## 2025-06-01 NOTE — BH INPATIENT PSYCHIATRY PROGRESS NOTE - MSE UNSTRUCTURED FT
Appearance: Dressed appropriately.  Attitude / Behavior / relatedness: cooperative  Eye contact: fair  Motor: No abnormal movements, no psychomotor slowing or activation.  Speech: Regular rate. Expansive.  Mood: "not good"  Affect: tearful  Thought Process: linear   Thought Content:  no suicidal ideation or hi. paranoid undertone may be present   Perceptual: denies AVH, does not appear internally preoccupied  Insight: poor  Judgment: poor  Impulse control: good on the unit   Cognition: grossly intact  Gait: Intact.
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
## 2025-06-01 NOTE — BH INPATIENT PSYCHIATRY PROGRESS NOTE - CURRENT MEDICATION
MEDICATIONS  (STANDING):    MEDICATIONS  (PRN):  acetaminophen     Tablet .. 650 milliGRAM(s) Oral every 6 hours PRN Mild Pain (1 - 3), Moderate Pain (4 - 6)  hydrOXYzine hydrochloride 25 milliGRAM(s) Oral three times a day PRN anxiety  ibuprofen  Tablet. 600 milliGRAM(s) Oral every 6 hours PRN Mild Pain (1 - 3), Moderate Pain (4 - 6)  LORazepam     Tablet 2 milliGRAM(s) Oral every 6 hours PRN Agitation  LORazepam   Injectable 2 milliGRAM(s) IntraMuscular Once PRN Severe Agitation  magnesium hydroxide Suspension 30 milliLiter(s) Oral daily PRN Constipation  nicotine  Polacrilex Gum 2 milliGRAM(s) Oral every 3 hours PRN Smoking Cessation  polyethylene glycol 3350 17 Gram(s) Oral daily PRN constipation

## 2025-06-02 PROCEDURE — 99232 SBSQ HOSP IP/OBS MODERATE 35: CPT

## 2025-06-02 RX ORDER — LORAZEPAM 4 MG/ML
2 VIAL (ML) INJECTION ONCE
Refills: 0 | Status: DISCONTINUED | OUTPATIENT
Start: 2025-06-02 | End: 2025-06-06

## 2025-06-02 RX ORDER — BISACODYL 5 MG
10 TABLET, DELAYED RELEASE (ENTERIC COATED) ORAL AT BEDTIME
Refills: 0 | Status: COMPLETED | OUTPATIENT
Start: 2025-06-02 | End: 2025-06-02

## 2025-06-02 RX ORDER — LITHIUM CARBONATE 600 MG/1
300 CAPSULE, GELATIN COATED ORAL
Refills: 0 | Status: DISCONTINUED | OUTPATIENT
Start: 2025-06-02 | End: 2025-06-06

## 2025-06-02 RX ORDER — SENNA 187 MG
2 TABLET ORAL AT BEDTIME
Refills: 0 | Status: DISCONTINUED | OUTPATIENT
Start: 2025-06-02 | End: 2025-06-06

## 2025-06-02 RX ORDER — LORAZEPAM 4 MG/ML
2 VIAL (ML) INJECTION EVERY 6 HOURS
Refills: 0 | Status: DISCONTINUED | OUTPATIENT
Start: 2025-06-02 | End: 2025-06-06

## 2025-06-02 RX ORDER — GABAPENTIN 400 MG/1
100 CAPSULE ORAL THREE TIMES A DAY
Refills: 0 | Status: DISCONTINUED | OUTPATIENT
Start: 2025-06-02 | End: 2025-06-06

## 2025-06-02 RX ADMIN — GABAPENTIN 100 MILLIGRAM(S): 400 CAPSULE ORAL at 22:40

## 2025-06-02 RX ADMIN — HYDROXYZINE HYDROCHLORIDE 25 MILLIGRAM(S): 25 TABLET, FILM COATED ORAL at 00:03

## 2025-06-02 RX ADMIN — Medication 2 MILLIGRAM(S): at 19:36

## 2025-06-02 RX ADMIN — NICOTINE POLACRILEX 2 MILLIGRAM(S): 4 GUM, CHEWING ORAL at 10:29

## 2025-06-02 RX ADMIN — NICOTINE POLACRILEX 2 MILLIGRAM(S): 4 GUM, CHEWING ORAL at 22:42
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
## 2025-06-02 NOTE — BH INPATIENT PSYCHIATRY PROGRESS NOTE - PRN MEDS
MEDICATIONS  (PRN):  acetaminophen     Tablet .. 650 milliGRAM(s) Oral every 6 hours PRN Mild Pain (1 - 3), Moderate Pain (4 - 6)  hydrOXYzine hydrochloride 25 milliGRAM(s) Oral three times a day PRN anxiety  ibuprofen  Tablet. 600 milliGRAM(s) Oral every 6 hours PRN Mild Pain (1 - 3), Moderate Pain (4 - 6)  LORazepam     Tablet 2 milliGRAM(s) Oral every 6 hours PRN Agitation 2/2 Mood  LORazepam   Injectable 2 milliGRAM(s) IntraMuscular once PRN Agitation 2/2 Mood  magnesium hydroxide Suspension 30 milliLiter(s) Oral daily PRN Constipation  nicotine  Polacrilex Gum 2 milliGRAM(s) Oral every 3 hours PRN Smoking Cessation  polyethylene glycol 3350 17 Gram(s) Oral daily PRN constipation   MEDICATIONS  (PRN):  acetaminophen     Tablet .. 650 milliGRAM(s) Oral every 6 hours PRN Mild Pain (1 - 3), Moderate Pain (4 - 6)  hydrOXYzine hydrochloride 25 milliGRAM(s) Oral three times a day PRN anxiety  LORazepam     Tablet 2 milliGRAM(s) Oral every 6 hours PRN Agitation 2/2 Mood  LORazepam   Injectable 2 milliGRAM(s) IntraMuscular once PRN Agitation 2/2 Mood  magnesium hydroxide Suspension 30 milliLiter(s) Oral daily PRN Constipation  nicotine  Polacrilex Gum 2 milliGRAM(s) Oral every 3 hours PRN Smoking Cessation  polyethylene glycol 3350 17 Gram(s) Oral daily PRN constipation

## 2025-06-02 NOTE — BH INPATIENT PSYCHIATRY PROGRESS NOTE - NSBHCHARTREVIEWVS_PSY_A_CORE FT
Vital Signs Last 24 Hrs  T(C): 36.7 (06-02-25 @ 08:29), Max: 36.7 (06-02-25 @ 08:29)  T(F): 98.1 (06-02-25 @ 08:29), Max: 98.1 (06-02-25 @ 08:29)  HR: --  BP: --  BP(mean): --  RR: 18 (06-02-25 @ 08:29) (18 - 18)  SpO2: --    Orthostatic VS  06-02-25 @ 08:29  Lying BP: --/-- HR: --  Sitting BP: 117/63 HR: 88  Standing BP: 109/63 HR: 107  Site: --  Mode: --  Orthostatic VS  06-01-25 @ 08:53  Lying BP: --/-- HR: --  Sitting BP: 102/74 HR: 97  Standing BP: 96/76 HR: 83  Site: --  Mode: --   Vital Signs Last 24 Hrs  T(C): 36.5 (06-03-25 @ 08:04), Max: 36.5 (06-03-25 @ 08:04)  T(F): 97.7 (06-03-25 @ 08:04), Max: 97.7 (06-03-25 @ 08:04)  HR: --  BP: --  BP(mean): --  RR: --  SpO2: --    Orthostatic VS  06-03-25 @ 08:04  Lying BP: --/-- HR: --  Sitting BP: 117/83 HR: 106  Standing BP: 102/79 HR: 119  Site: --  Mode: --  Orthostatic VS  06-02-25 @ 08:29  Lying BP: --/-- HR: --  Sitting BP: 117/63 HR: 88  Standing BP: 109/63 HR: 107  Site: --  Mode: --

## 2025-06-02 NOTE — BH INPATIENT PSYCHIATRY PROGRESS NOTE - CURRENT MEDICATION
MEDICATIONS  (STANDING):    MEDICATIONS  (PRN):  acetaminophen     Tablet .. 650 milliGRAM(s) Oral every 6 hours PRN Mild Pain (1 - 3), Moderate Pain (4 - 6)  hydrOXYzine hydrochloride 25 milliGRAM(s) Oral three times a day PRN anxiety  ibuprofen  Tablet. 600 milliGRAM(s) Oral every 6 hours PRN Mild Pain (1 - 3), Moderate Pain (4 - 6)  LORazepam     Tablet 2 milliGRAM(s) Oral every 6 hours PRN Agitation 2/2 Mood  LORazepam   Injectable 2 milliGRAM(s) IntraMuscular once PRN Agitation 2/2 Mood  magnesium hydroxide Suspension 30 milliLiter(s) Oral daily PRN Constipation  nicotine  Polacrilex Gum 2 milliGRAM(s) Oral every 3 hours PRN Smoking Cessation  polyethylene glycol 3350 17 Gram(s) Oral daily PRN constipation   MEDICATIONS  (STANDING):  gabapentin 100 milliGRAM(s) Oral three times a day  lithium SR (LITHOBID) 300 milliGRAM(s) Oral two times a day  senna 2 Tablet(s) Oral at bedtime    MEDICATIONS  (PRN):  acetaminophen     Tablet .. 650 milliGRAM(s) Oral every 6 hours PRN Mild Pain (1 - 3), Moderate Pain (4 - 6)  hydrOXYzine hydrochloride 25 milliGRAM(s) Oral three times a day PRN anxiety  LORazepam     Tablet 2 milliGRAM(s) Oral every 6 hours PRN Agitation 2/2 Mood  LORazepam   Injectable 2 milliGRAM(s) IntraMuscular once PRN Agitation 2/2 Mood  magnesium hydroxide Suspension 30 milliLiter(s) Oral daily PRN Constipation  nicotine  Polacrilex Gum 2 milliGRAM(s) Oral every 3 hours PRN Smoking Cessation  polyethylene glycol 3350 17 Gram(s) Oral daily PRN constipation
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
## 2025-06-02 NOTE — BH INPATIENT PSYCHIATRY PROGRESS NOTE - NSBHMETABOLIC_PSY_ALL_CORE_FT
BMI: BMI (kg/m2): 21.8 (09-22-24 @ 21:25)  HbA1c:   Glucose:   BP: --Vital Signs Last 24 Hrs  T(C): 36.7 (06-02-25 @ 08:29), Max: 36.7 (06-02-25 @ 08:29)  T(F): 98.1 (06-02-25 @ 08:29), Max: 98.1 (06-02-25 @ 08:29)  HR: --  BP: --  BP(mean): --  RR: 18 (06-02-25 @ 08:29) (18 - 18)  SpO2: --    Orthostatic VS  06-02-25 @ 08:29  Lying BP: --/-- HR: --  Sitting BP: 117/63 HR: 88  Standing BP: 109/63 HR: 107  Site: --  Mode: --  Orthostatic VS  06-01-25 @ 08:53  Lying BP: --/-- HR: --  Sitting BP: 102/74 HR: 97  Standing BP: 96/76 HR: 83  Site: --  Mode: --    Lipid Panel:  BMI: BMI (kg/m2): 21.8 (09-22-24 @ 21:25)  HbA1c:   Glucose:   BP: --Vital Signs Last 24 Hrs  T(C): 36.5 (06-03-25 @ 08:04), Max: 36.5 (06-03-25 @ 08:04)  T(F): 97.7 (06-03-25 @ 08:04), Max: 97.7 (06-03-25 @ 08:04)  HR: --  BP: --  BP(mean): --  RR: --  SpO2: --    Orthostatic VS  06-03-25 @ 08:04  Lying BP: --/-- HR: --  Sitting BP: 117/83 HR: 106  Standing BP: 102/79 HR: 119  Site: --  Mode: --  Orthostatic VS  06-02-25 @ 08:29  Lying BP: --/-- HR: --  Sitting BP: 117/63 HR: 88  Standing BP: 109/63 HR: 107  Site: --  Mode: --    Lipid Panel:

## 2025-06-03 PROCEDURE — 99232 SBSQ HOSP IP/OBS MODERATE 35: CPT

## 2025-06-03 RX ADMIN — GABAPENTIN 100 MILLIGRAM(S): 400 CAPSULE ORAL at 08:47

## 2025-06-03 RX ADMIN — NICOTINE POLACRILEX 2 MILLIGRAM(S): 4 GUM, CHEWING ORAL at 16:31

## 2025-06-03 RX ADMIN — GABAPENTIN 100 MILLIGRAM(S): 400 CAPSULE ORAL at 12:25

## 2025-06-03 RX ADMIN — GABAPENTIN 100 MILLIGRAM(S): 400 CAPSULE ORAL at 22:23

## 2025-06-03 RX ADMIN — Medication 2 TABLET(S): at 22:23

## 2025-06-03 RX ADMIN — NICOTINE POLACRILEX 2 MILLIGRAM(S): 4 GUM, CHEWING ORAL at 12:25

## 2025-06-03 RX ADMIN — LITHIUM CARBONATE 300 MILLIGRAM(S): 600 CAPSULE, GELATIN COATED ORAL at 22:23

## 2025-06-03 NOTE — BH INPATIENT PSYCHIATRY PROGRESS NOTE - NSBHMETABOLIC_PSY_ALL_CORE_FT
BMI: BMI (kg/m2): 21.8 (09-22-24 @ 21:25)  HbA1c:   Glucose:   BP: --Vital Signs Last 24 Hrs  T(C): 36.5 (06-03-25 @ 08:04), Max: 36.5 (06-03-25 @ 08:04)  T(F): 97.7 (06-03-25 @ 08:04), Max: 97.7 (06-03-25 @ 08:04)  HR: --  BP: --  BP(mean): --  RR: --  SpO2: --    Orthostatic VS  06-03-25 @ 08:04  Lying BP: --/-- HR: --  Sitting BP: 117/83 HR: 106  Standing BP: 102/79 HR: 119  Site: --  Mode: --  Orthostatic VS  06-02-25 @ 08:29  Lying BP: --/-- HR: --  Sitting BP: 117/63 HR: 88  Standing BP: 109/63 HR: 107  Site: --  Mode: --    Lipid Panel:

## 2025-06-03 NOTE — BH INPATIENT PSYCHIATRY PROGRESS NOTE - PRN MEDS
MEDICATIONS  (PRN):  acetaminophen     Tablet .. 650 milliGRAM(s) Oral every 6 hours PRN Mild Pain (1 - 3), Moderate Pain (4 - 6)  hydrOXYzine hydrochloride 25 milliGRAM(s) Oral three times a day PRN anxiety  LORazepam     Tablet 2 milliGRAM(s) Oral every 6 hours PRN Agitation 2/2 Mood  LORazepam   Injectable 2 milliGRAM(s) IntraMuscular once PRN Agitation 2/2 Mood  magnesium hydroxide Suspension 30 milliLiter(s) Oral daily PRN Constipation  nicotine  Polacrilex Gum 2 milliGRAM(s) Oral every 3 hours PRN Smoking Cessation  polyethylene glycol 3350 17 Gram(s) Oral daily PRN constipation
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
## 2025-06-03 NOTE — BH INPATIENT PSYCHIATRY PROGRESS NOTE - ATTENDING COMMENTS
Care was discussed and reviewed in the interdisciplinary treatment team.  I, Gadiel Ayon MD, have reviewed and verified the documentation. 
Care was discussed and reviewed in the interdisciplinary treatment team.  I, Gadiel Ayon MD, have reviewed and verified the documentation.

## 2025-06-03 NOTE — BH INPATIENT PSYCHIATRY PROGRESS NOTE - NSBHATTESTAPPBILLTIME_PSY_A_CORE
I attest my time as JOSE F is greater than 50% of the total combined time spent on qualifying patient care activities. I have reviewed and verified the documentation.
I attest my time as JOSE F is greater than 50% of the total combined time spent on qualifying patient care activities. I have reviewed and verified the documentation.

## 2025-06-03 NOTE — BH INPATIENT PSYCHIATRY PROGRESS NOTE - NSBHCHARTREVIEWVS_PSY_A_CORE FT
Vital Signs Last 24 Hrs  T(C): 36.5 (06-03-25 @ 08:04), Max: 36.5 (06-03-25 @ 08:04)  T(F): 97.7 (06-03-25 @ 08:04), Max: 97.7 (06-03-25 @ 08:04)  HR: --  BP: --  BP(mean): --  RR: --  SpO2: --    Orthostatic VS  06-03-25 @ 08:04  Lying BP: --/-- HR: --  Sitting BP: 117/83 HR: 106  Standing BP: 102/79 HR: 119  Site: --  Mode: --  Orthostatic VS  06-02-25 @ 08:29  Lying BP: --/-- HR: --  Sitting BP: 117/63 HR: 88  Standing BP: 109/63 HR: 107  Site: --  Mode: --

## 2025-06-03 NOTE — BH INPATIENT PSYCHIATRY PROGRESS NOTE - CURRENT MEDICATION
MEDICATIONS  (STANDING):  gabapentin 100 milliGRAM(s) Oral three times a day  lithium SR (LITHOBID) 300 milliGRAM(s) Oral two times a day  senna 2 Tablet(s) Oral at bedtime    MEDICATIONS  (PRN):  acetaminophen     Tablet .. 650 milliGRAM(s) Oral every 6 hours PRN Mild Pain (1 - 3), Moderate Pain (4 - 6)  hydrOXYzine hydrochloride 25 milliGRAM(s) Oral three times a day PRN anxiety  LORazepam     Tablet 2 milliGRAM(s) Oral every 6 hours PRN Agitation 2/2 Mood  LORazepam   Injectable 2 milliGRAM(s) IntraMuscular once PRN Agitation 2/2 Mood  magnesium hydroxide Suspension 30 milliLiter(s) Oral daily PRN Constipation  nicotine  Polacrilex Gum 2 milliGRAM(s) Oral every 3 hours PRN Smoking Cessation  polyethylene glycol 3350 17 Gram(s) Oral daily PRN constipation
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
## 2025-06-04 RX ORDER — MELATONIN 5 MG
5 TABLET ORAL AT BEDTIME
Refills: 0 | Status: DISCONTINUED | OUTPATIENT
Start: 2025-06-04 | End: 2025-06-06

## 2025-06-04 RX ORDER — MELATONIN 5 MG
5 TABLET ORAL ONCE
Refills: 0 | Status: COMPLETED | OUTPATIENT
Start: 2025-06-04 | End: 2025-06-04

## 2025-06-04 RX ADMIN — Medication 5 MILLIGRAM(S): at 00:35

## 2025-06-04 RX ADMIN — Medication 2 TABLET(S): at 21:02

## 2025-06-04 RX ADMIN — GABAPENTIN 100 MILLIGRAM(S): 400 CAPSULE ORAL at 08:24

## 2025-06-04 RX ADMIN — POLYETHYLENE GLYCOL 3350 17 GRAM(S): 17 POWDER, FOR SOLUTION ORAL at 13:30

## 2025-06-04 RX ADMIN — Medication 5 MILLIGRAM(S): at 21:02

## 2025-06-04 RX ADMIN — NICOTINE POLACRILEX 2 MILLIGRAM(S): 4 GUM, CHEWING ORAL at 21:13

## 2025-06-04 RX ADMIN — GABAPENTIN 100 MILLIGRAM(S): 400 CAPSULE ORAL at 21:02

## 2025-06-04 RX ADMIN — LITHIUM CARBONATE 300 MILLIGRAM(S): 600 CAPSULE, GELATIN COATED ORAL at 21:02

## 2025-06-04 RX ADMIN — GABAPENTIN 100 MILLIGRAM(S): 400 CAPSULE ORAL at 13:29

## 2025-06-04 RX ADMIN — LITHIUM CARBONATE 300 MILLIGRAM(S): 600 CAPSULE, GELATIN COATED ORAL at 08:25

## 2025-06-04 RX ADMIN — NICOTINE POLACRILEX 2 MILLIGRAM(S): 4 GUM, CHEWING ORAL at 15:12

## 2025-06-04 NOTE — BH INPATIENT PSYCHIATRY PROGRESS NOTE - NSBHCHARTREVIEWVS_PSY_A_CORE FT
Vital Signs Last 24 Hrs  T(C): 36.3 (06-04-25 @ 08:00), Max: 36.3 (06-04-25 @ 08:00)  T(F): 97.4 (06-04-25 @ 08:00), Max: 97.4 (06-04-25 @ 08:00)  HR: --  BP: --  BP(mean): --  RR: 20 (06-04-25 @ 08:00) (20 - 20)  SpO2: --    Orthostatic VS  06-04-25 @ 08:00  Lying BP: --/-- HR: --  Sitting BP: 111/676 HR: 78  Standing BP: 100/67 HR: 93  Site: --  Mode: --  Orthostatic VS  06-03-25 @ 08:04  Lying BP: --/-- HR: --  Sitting BP: 117/83 HR: 106  Standing BP: 102/79 HR: 119  Site: --  Mode: --

## 2025-06-04 NOTE — BH INPATIENT PSYCHIATRY PROGRESS NOTE - NSBHMETABOLIC_PSY_ALL_CORE_FT
BMI: BMI (kg/m2): 21.8 (09-22-24 @ 21:25)  HbA1c:   Glucose:   BP: --Vital Signs Last 24 Hrs  T(C): 36.3 (06-04-25 @ 08:00), Max: 36.3 (06-04-25 @ 08:00)  T(F): 97.4 (06-04-25 @ 08:00), Max: 97.4 (06-04-25 @ 08:00)  HR: --  BP: --  BP(mean): --  RR: 20 (06-04-25 @ 08:00) (20 - 20)  SpO2: --    Orthostatic VS  06-04-25 @ 08:00  Lying BP: --/-- HR: --  Sitting BP: 111/676 HR: 78  Standing BP: 100/67 HR: 93  Site: --  Mode: --  Orthostatic VS  06-03-25 @ 08:04  Lying BP: --/-- HR: --  Sitting BP: 117/83 HR: 106  Standing BP: 102/79 HR: 119  Site: --  Mode: --    Lipid Panel:
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
## 2025-06-04 NOTE — BH INPATIENT PSYCHIATRY PROGRESS NOTE - CURRENT MEDICATION
MEDICATIONS  (STANDING):  gabapentin 100 milliGRAM(s) Oral three times a day  lithium SR (LITHOBID) 300 milliGRAM(s) Oral two times a day  senna 2 Tablet(s) Oral at bedtime    MEDICATIONS  (PRN):  acetaminophen     Tablet .. 650 milliGRAM(s) Oral every 6 hours PRN Mild Pain (1 - 3), Moderate Pain (4 - 6)  hydrOXYzine hydrochloride 25 milliGRAM(s) Oral three times a day PRN anxiety  LORazepam     Tablet 2 milliGRAM(s) Oral every 6 hours PRN Agitation 2/2 Mood  LORazepam   Injectable 2 milliGRAM(s) IntraMuscular once PRN Agitation 2/2 Mood  magnesium hydroxide Suspension 30 milliLiter(s) Oral daily PRN Constipation  melatonin. 5 milliGRAM(s) Oral at bedtime PRN Insomnia  nicotine  Polacrilex Gum 2 milliGRAM(s) Oral every 3 hours PRN Smoking Cessation  polyethylene glycol 3350 17 Gram(s) Oral daily PRN constipation

## 2025-06-04 NOTE — BH INPATIENT PSYCHIATRY PROGRESS NOTE - NSBHFUPINTERVALHXFT_PSY_A_CORE
Chart reviewed and case discussed in team meeting. Patient was adherent with Lithium, denied SE. Patient reported feeling calmer, less on edge and more trusting of the staff. Patient still discussed past trauma. Patient refused to complete scales because she does not want to receive a formal diagnosis here. Patient was visible on the unit, seen interacting with peers, attending groups. She denied SIIP, HIIP, AVH.

## 2025-06-04 NOTE — BH INPATIENT PSYCHIATRY PROGRESS NOTE - PRN MEDS
MEDICATIONS  (PRN):  acetaminophen     Tablet .. 650 milliGRAM(s) Oral every 6 hours PRN Mild Pain (1 - 3), Moderate Pain (4 - 6)  hydrOXYzine hydrochloride 25 milliGRAM(s) Oral three times a day PRN anxiety  LORazepam     Tablet 2 milliGRAM(s) Oral every 6 hours PRN Agitation 2/2 Mood  LORazepam   Injectable 2 milliGRAM(s) IntraMuscular once PRN Agitation 2/2 Mood  magnesium hydroxide Suspension 30 milliLiter(s) Oral daily PRN Constipation  melatonin. 5 milliGRAM(s) Oral at bedtime PRN Insomnia  nicotine  Polacrilex Gum 2 milliGRAM(s) Oral every 3 hours PRN Smoking Cessation  polyethylene glycol 3350 17 Gram(s) Oral daily PRN constipation

## 2025-06-05 RX ADMIN — GABAPENTIN 100 MILLIGRAM(S): 400 CAPSULE ORAL at 21:56

## 2025-06-05 RX ADMIN — LITHIUM CARBONATE 300 MILLIGRAM(S): 600 CAPSULE, GELATIN COATED ORAL at 08:43

## 2025-06-05 RX ADMIN — LITHIUM CARBONATE 300 MILLIGRAM(S): 600 CAPSULE, GELATIN COATED ORAL at 21:56

## 2025-06-05 RX ADMIN — Medication 2 TABLET(S): at 21:57

## 2025-06-05 RX ADMIN — GABAPENTIN 100 MILLIGRAM(S): 400 CAPSULE ORAL at 12:12

## 2025-06-05 RX ADMIN — GABAPENTIN 100 MILLIGRAM(S): 400 CAPSULE ORAL at 08:43

## 2025-06-05 RX ADMIN — Medication 650 MILLIGRAM(S): at 21:56

## 2025-06-05 RX ADMIN — NICOTINE POLACRILEX 2 MILLIGRAM(S): 4 GUM, CHEWING ORAL at 10:24

## 2025-06-05 NOTE — BH INPATIENT PSYCHIATRY PROGRESS NOTE - NSTXPROBDCSOC_PSY_ALL_CORE
DISCHARGE ISSUE - POOR SOCIALIZATION IN COMMUNITY
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
## 2025-06-05 NOTE — BH INPATIENT PSYCHIATRY PROGRESS NOTE - NSTXDEPRESDATEEST_PSY_ALL_CORE
28-May-2025
Billing Type: Third-Party Bill

## 2025-06-05 NOTE — BH INPATIENT PSYCHIATRY PROGRESS NOTE - NSDCCRITERIA_PSY_ALL_CORE
When pt is no longer an acute or imminent risk of harm to self or others, and is able to care for self safely, pt may then be discharged.  

## 2025-06-05 NOTE — BH INPATIENT PSYCHIATRY PROGRESS NOTE - NSBHCHARTREVIEWVS_PSY_A_CORE FT
Vital Signs Last 24 Hrs  T(C): 36.6 (06-05-25 @ 09:28), Max: 36.6 (06-05-25 @ 09:28)  T(F): 97.8 (06-05-25 @ 09:28), Max: 97.8 (06-05-25 @ 09:28)  HR: --  BP: --  BP(mean): --  RR: 18 (06-05-25 @ 09:28) (18 - 18)  SpO2: --    Orthostatic VS  06-05-25 @ 09:28  Lying BP: --/-- HR: --  Sitting BP: 105/73 HR: 98  Standing BP: 111/78 HR: 115  Site: --  Mode: --  Orthostatic VS  06-04-25 @ 08:00  Lying BP: --/-- HR: --  Sitting BP: 111/676 HR: 78  Standing BP: 100/67 HR: 93  Site: --  Mode: --

## 2025-06-05 NOTE — BH INPATIENT PSYCHIATRY PROGRESS NOTE - NSBHATTESTTYPEVISIT_PSY_A_CORE
JOSE F without on-site Attending supervision
Attending Only
On-site Attending supervising JOSE F (99XXX codes)
Attending Only
On-site Attending supervising JOSE F (99XXX codes)

## 2025-06-05 NOTE — BH INPATIENT PSYCHIATRY PROGRESS NOTE - NSBHMETABOLIC_PSY_ALL_CORE_FT
BMI: BMI (kg/m2): 21.8 (09-22-24 @ 21:25)  HbA1c:   Glucose:   BP: --Vital Signs Last 24 Hrs  T(C): 36.6 (06-05-25 @ 09:28), Max: 36.6 (06-05-25 @ 09:28)  T(F): 97.8 (06-05-25 @ 09:28), Max: 97.8 (06-05-25 @ 09:28)  HR: --  BP: --  BP(mean): --  RR: 18 (06-05-25 @ 09:28) (18 - 18)  SpO2: --    Orthostatic VS  06-05-25 @ 09:28  Lying BP: --/-- HR: --  Sitting BP: 105/73 HR: 98  Standing BP: 111/78 HR: 115  Site: --  Mode: --  Orthostatic VS  06-04-25 @ 08:00  Lying BP: --/-- HR: --  Sitting BP: 111/676 HR: 78  Standing BP: 100/67 HR: 93  Site: --  Mode: --    Lipid Panel:

## 2025-06-05 NOTE — BH INPATIENT PSYCHIATRY PROGRESS NOTE - NSBHFUPINTERVALHXFT_PSY_A_CORE
Chart reviewed and case discussed in team meeting. Patient was adherent with Lithium this AM but refused last night, denied SE. Patient reported feeling calmer, less on edge and improved mood at this time.  Patient was visible on the unit, seen interacting with peers, attending groups. She denied SIIP, HIIP, AVH.  No agitation.  No somatic complaints.

## 2025-06-05 NOTE — BH INPATIENT PSYCHIATRY PROGRESS NOTE - NSTXCOPEINTERMD_PSY_ALL_CORE
Medications + therapy 

## 2025-06-05 NOTE — BH INPATIENT PSYCHIATRY PROGRESS NOTE - NSBHATTESTBILLING_PSY_A_CORE
77053-Suugomtzsk OBS or IP - moderate complexity OR 35-49 mins
43601-Kctjiyavmh OBS or IP - moderate complexity OR 35-49 mins
63759-Osxboctjrk OBS or IP - moderate complexity OR 35-49 mins
52141-Swsqjuqubd OBS or IP - moderate complexity OR 35-49 mins
14663-Jakfjfosdv OBS or IP - moderate complexity OR 35-49 mins
76078-Rcqsbonxcy OBS or IP - moderate complexity OR 35-49 mins
66824-Ldmvtuooeb OBS or IP - moderate complexity OR 35-49 mins
83534-Igalrdgxfm OBS or IP - moderate complexity OR 35-49 mins

## 2025-06-05 NOTE — BH INPATIENT PSYCHIATRY PROGRESS NOTE - NSBHFUPINTERVALCCFT_PSY_A_CORE
f/u mood, SI
f/u mood, SI
follow up mood

## 2025-06-05 NOTE — BH INPATIENT PSYCHIATRY PROGRESS NOTE - NSTXMANICGOAL_PSY_ALL_CORE
Exhibit a substantial reduction in elated/angry acting out, and pressured speech that prevents mutual conversation
State a reason daily why adherence to mood stabilizers is necessary
Exhibit a substantial reduction in elated/angry acting out, and pressured speech that prevents mutual conversation

## 2025-06-06 VITALS — RESPIRATION RATE: 18 BRPM | TEMPERATURE: 98 F

## 2025-06-06 LAB
ANION GAP SERPL CALC-SCNC: 7 MMOL/L — SIGNIFICANT CHANGE UP (ref 7–14)
BUN SERPL-MCNC: 11 MG/DL — SIGNIFICANT CHANGE UP (ref 7–23)
CALCIUM SERPL-MCNC: 9 MG/DL — SIGNIFICANT CHANGE UP (ref 8.4–10.5)
CHLORIDE SERPL-SCNC: 103 MMOL/L — SIGNIFICANT CHANGE UP (ref 98–107)
CO2 SERPL-SCNC: 27 MMOL/L — SIGNIFICANT CHANGE UP (ref 22–31)
CREAT SERPL-MCNC: 0.69 MG/DL — SIGNIFICANT CHANGE UP (ref 0.5–1.3)
EGFR: 127 ML/MIN/1.73M2 — SIGNIFICANT CHANGE UP
EGFR: 127 ML/MIN/1.73M2 — SIGNIFICANT CHANGE UP
GLUCOSE SERPL-MCNC: 90 MG/DL — SIGNIFICANT CHANGE UP (ref 70–99)
LITHIUM SERPL-MCNC: 0.7 MMOL/L — SIGNIFICANT CHANGE UP (ref 0.6–1.2)
POTASSIUM SERPL-MCNC: 4.9 MMOL/L — SIGNIFICANT CHANGE UP (ref 3.5–5.3)
POTASSIUM SERPL-SCNC: 4.9 MMOL/L — SIGNIFICANT CHANGE UP (ref 3.5–5.3)
SODIUM SERPL-SCNC: 137 MMOL/L — SIGNIFICANT CHANGE UP (ref 135–145)

## 2025-06-06 RX ORDER — SENNA 187 MG
2 TABLET ORAL
Qty: 0 | Refills: 0 | DISCHARGE
Start: 2025-06-06

## 2025-06-06 RX ORDER — LITHIUM CARBONATE 600 MG/1
1 CAPSULE, GELATIN COATED ORAL
Qty: 30 | Refills: 0
Start: 2025-06-06 | End: 2025-06-20

## 2025-06-06 RX ORDER — GABAPENTIN 400 MG/1
1 CAPSULE ORAL
Qty: 45 | Refills: 0
Start: 2025-06-06 | End: 2025-06-20

## 2025-06-06 RX ADMIN — LITHIUM CARBONATE 300 MILLIGRAM(S): 600 CAPSULE, GELATIN COATED ORAL at 09:16

## 2025-06-06 RX ADMIN — NICOTINE POLACRILEX 2 MILLIGRAM(S): 4 GUM, CHEWING ORAL at 09:17

## 2025-06-06 RX ADMIN — GABAPENTIN 100 MILLIGRAM(S): 400 CAPSULE ORAL at 12:23

## 2025-06-06 RX ADMIN — HYDROXYZINE HYDROCHLORIDE 25 MILLIGRAM(S): 25 TABLET, FILM COATED ORAL at 02:13

## 2025-06-06 RX ADMIN — GABAPENTIN 100 MILLIGRAM(S): 400 CAPSULE ORAL at 09:17

## 2025-06-06 NOTE — BH DISCHARGE NOTE NURSING/SOCIAL WORK/PSYCH REHAB - NSDCPEEMAIL_GEN_ALL_CORE
[facility] for Tobacco Control email [email or web address]
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
## 2025-06-06 NOTE — BH DISCHARGE NOTE NURSING/SOCIAL WORK/PSYCH REHAB - NSCDUDCCRISIS_PSY_A_CORE
[facility]  1 (609) [facility]WELL ([phone])  Text "WELL" to 32454  Website: www.[employer]/.[employer] [facility] 1 (014) 621-CGVJ (8553) Website: www.safehorizon.org/.National Suicide Prevention Lifeline 1 (539) 733-0201/.  [facility]  1 (154) LIFENET (592-7824)/.  [facility]  [address] [place] [address] [phone]   Hours:  Monday through Friday from 9 AM to 3 PM/988 Suicide and Crisis Lifeline

## 2025-06-06 NOTE — BH SAFETY PLAN - THE ONE THING THAT IS MOST IMPORTANT TO ME AND WORTH LIVING FOR IS:
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 I deserve to thrive, not to survive. living a life that's not just self-sacrifice but because of how my life shaped me.

## 2025-06-06 NOTE — BH DISCHARGE NOTE NURSING/SOCIAL WORK/PSYCH REHAB - DISCHARGE INSTRUCTIONS AFTERCARE APPOINTMENTS
In order to check the location, date, or time of your aftercare appointment, please refer to your Discharge Instructions Document given to you upon leaving the hospital.  If you have lost the instructions please call [phone]
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
## 2025-06-06 NOTE — BH INPATIENT PSYCHIATRY DISCHARGE NOTE - NSBHMETABOLIC_PSY_ALL_CORE_FT
BMI: BMI (kg/m2): 21.8 (09-22-24 @ 21:25)  HbA1c:   Glucose:   BP: --Vital Signs Last 24 Hrs  T(C): --  T(F): --  HR: --  BP: --  BP(mean): --  RR: --  SpO2: --    Orthostatic VS  06-05-25 @ 09:28  Lying BP: --/-- HR: --  Sitting BP: 105/73 HR: 98  Standing BP: 111/78 HR: 115  Site: --  Mode: --    Lipid Panel:

## 2025-06-06 NOTE — BH INPATIENT PSYCHIATRY DISCHARGE NOTE - NSBHFUPINTERVALHXFT_PSY_A_CORE
Chart reviewed and case discussed in team meeting. Patient was adherent with Lithium this AM but refused last night, denied SE. Patient reported feeling calmer, less on edge and improved mood at this time.  Patient was visible on the unit, seen interacting with peers, attending groups. She denied SIIP, HIIP, AVH.  No agitation.  No somatic complaints. Chart reviewed and case discussed in team meeting. Patient has been adherent with Lithium, denied SE.  Pt submits a 3 day letter requesting discharge at this time and is positive, future oriented  Patient reported feeling calmer, less on edge, denies depression, anxiety and improved mood at this time.  Patient was visible on the unit, seen interacting with peers, attending groups. She denied SIIP, HIIP, AVH.  No agitation.  No somatic complaints.  No acute signs or symptoms of dangerousness.  Labs ordered and Li level of 0.7, BMP wnl.

## 2025-06-06 NOTE — BH DISCHARGE NOTE NURSING/SOCIAL WORK/PSYCH REHAB - NSDCPRGOAL_PSY_ALL_CORE
The patient minimally met her specified goal to take all medications as prescribed for her medication/ treatment non-compliance goal. Progress was evidenced by an improved ability to tolerate safety planning and engaging in appropriate and supportive dialogue with peers during communal engagement. The patient attended 63% of the Psychiatric Rehabilitation groups throughout her stay. The writer completed the patient’s safety plan during this session.

## 2025-06-06 NOTE — BH INPATIENT PSYCHIATRY DISCHARGE NOTE - NSDCMRMEDTOKEN_GEN_ALL_CORE_FT
gabapentin 100 mg oral capsule: 1 cap(s) orally 3 times a day  lithium 300 mg oral tablet, extended release: 1 tab(s) orally 2 times a day  senna leaf extract oral tablet: 2 tab(s) orally once a day (at bedtime)

## 2025-06-06 NOTE — BH DISCHARGE NOTE NURSING/SOCIAL WORK/PSYCH REHAB - NSDCPEWEB_GEN_ALL_CORE
[facility] for Tobacco Control website --- [email or web address] website --- [email or web address]
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
## 2025-06-06 NOTE — BH DISCHARGE NOTE NURSING/SOCIAL WORK/PSYCH REHAB - PATIENT PORTAL LINK FT
You can access the FollowMyHealth Patient Portal offered by [facility] by registering at the following website: [email or web address]. By joining [facility]’s FollowMyHealth portal, you will also be able to view your health information using other applications (apps) compatible with our system.
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
## 2025-06-20 RX ORDER — GABAPENTIN 400 MG/1
1 CAPSULE ORAL
Qty: 45 | Refills: 0
Start: 2025-06-20 | End: 2025-07-04

## 2025-06-20 RX ORDER — LITHIUM CARBONATE 600 MG/1
1 CAPSULE, GELATIN COATED ORAL
Qty: 30 | Refills: 0
Start: 2025-06-20 | End: 2025-07-04

## 2025-09-05 ENCOUNTER — APPOINTMENT (OUTPATIENT)
Dept: ORTHOPEDIC SURGERY | Facility: CLINIC | Age: 20
End: 2025-09-05

## (undated) DEVICE — VAGINAL PACKING 2 X 6"

## (undated) DEVICE — Device

## (undated) DEVICE — SOL IRR POUR NS 0.9% 500ML

## (undated) DEVICE — WARMING BLANKET UPPER ADULT

## (undated) DEVICE — GLV 7 PROTEXIS (WHITE)

## (undated) DEVICE — SUT POLYSORB 0 36" GS-24 UNDYED

## (undated) DEVICE — GOWN TRIMAX LG

## (undated) DEVICE — SUT POLYSORB 2-0 30" GS-21 UNDYED

## (undated) DEVICE — POSITIONER FOAM EGG CRATE ULNAR 2PCS (PINK)

## (undated) DEVICE — ELCTR BOVIE PENCIL SMOKE EVACUATION

## (undated) DEVICE — ELCTR BOVIE TIP BLADE INSULATED 2.75" EDGE

## (undated) DEVICE — LAP PAD 18 X 18"

## (undated) DEVICE — CATH IV SAFE INSYTE 18G X 1.16" (GREEN)

## (undated) DEVICE — SOL IRR BAG NS 0.9% 3000ML

## (undated) DEVICE — DRAPE IOBAN 23" X 23"

## (undated) DEVICE — DRAPE 1/2 SHEET 40X57"

## (undated) DEVICE — SYR LUER LOK 10CC

## (undated) DEVICE — STRYKER INTERPULSE HANDPIECE W IRR SUCTION TUBE

## (undated) DEVICE — MARKING PEN W RULER

## (undated) DEVICE — ELCTR BIPOLAR CORD J&J 12FT DISP

## (undated) DEVICE — ELCTR BOVIE TIP BLADE INSULATED 6.5" EDGE

## (undated) DEVICE — VISITEC 4X4

## (undated) DEVICE — VENODYNE/SCD SLEEVE CALF LARGE

## (undated) DEVICE — GLV 7.5 PROTEXIS (WHITE)

## (undated) DEVICE — SUCTION YANKAUER NO CONTROL VENT

## (undated) DEVICE — DRSG TEGADERM 6"X8"

## (undated) DEVICE — SYR LUER LOK 20CC

## (undated) DEVICE — GLV 6.5 PROTEXIS (WHITE)

## (undated) DEVICE — PACK LUMBAR LAMI

## (undated) DEVICE — PREP CHLORAPREP HI-LITE ORANGE 26ML

## (undated) DEVICE — DRAPE TOWEL BLUE 17" X 24"

## (undated) DEVICE — DRSG TAPE TRANSPORE 3"

## (undated) DEVICE — BUR LINVATEC ROUND LONG 4MM

## (undated) DEVICE — DRAPE MAYO STAND 30"

## (undated) DEVICE — DRSG STERISTRIPS 0.5 X 4"

## (undated) DEVICE — NDL HYPO SAFE 18G X 1.5" (PINK)

## (undated) DEVICE — POSITIONER CUSHION INSERT PRONE VIEW LG

## (undated) DEVICE — SUT BIOSYN 3-0 18" P-12

## (undated) DEVICE — DRSG DERMABOND 0.7ML

## (undated) DEVICE — NDL HYPO SAFE 22G X 1.5" (BLACK)

## (undated) DEVICE — ELCTR BOVIE PENCIL HANDPIECE

## (undated) DEVICE — SPECIMEN CONTAINER 100ML

## (undated) DEVICE — FOLEY TRAY 16FR 5CC LTX UMETER CLOSED

## (undated) DEVICE — GLV 8.5 PROTEXIS (WHITE)

## (undated) DEVICE — MEDICATION LABELS W MARKER

## (undated) DEVICE — BIPOLAR FORCEP SYMMETRY BAYONET 7" X 1.5MM SMOOTH (SILVER)

## (undated) DEVICE — SOL IRR POUR H2O 250ML

## (undated) DEVICE — GLV 8 PROTEXIS (WHITE)

## (undated) DEVICE — NDL SPINAL 18G X 3.5" (PINK)